# Patient Record
Sex: FEMALE | Race: WHITE | Employment: FULL TIME | URBAN - METROPOLITAN AREA
[De-identification: names, ages, dates, MRNs, and addresses within clinical notes are randomized per-mention and may not be internally consistent; named-entity substitution may affect disease eponyms.]

---

## 2017-05-08 DIAGNOSIS — F41.9 ANXIETY: Chronic | ICD-10-CM

## 2017-05-08 RX ORDER — HYDROCHLOROTHIAZIDE 25 MG/1
TABLET ORAL
Qty: 90 TAB | Refills: 0 | Status: SHIPPED | OUTPATIENT
Start: 2017-05-08 | End: 2017-06-27 | Stop reason: SDUPTHER

## 2017-05-08 RX ORDER — FLUOXETINE 10 MG/1
10 TABLET ORAL DAILY
Qty: 90 TAB | Refills: 0 | Status: SHIPPED | OUTPATIENT
Start: 2017-05-08 | End: 2018-01-15

## 2017-05-08 RX ORDER — LOSARTAN POTASSIUM 25 MG/1
25 TABLET ORAL DAILY
Qty: 90 TAB | Refills: 0 | Status: SHIPPED | OUTPATIENT
Start: 2017-05-08 | End: 2017-06-27 | Stop reason: SDUPTHER

## 2017-05-08 RX ORDER — FLUOXETINE 20 MG/1
20 TABLET ORAL DAILY
Qty: 90 TAB | Refills: 0 | Status: SHIPPED | OUTPATIENT
Start: 2017-05-08 | End: 2017-06-27 | Stop reason: SDUPTHER

## 2017-06-27 DIAGNOSIS — F41.9 ANXIETY: Chronic | ICD-10-CM

## 2017-06-28 RX ORDER — LOSARTAN POTASSIUM 25 MG/1
25 TABLET ORAL DAILY
Qty: 90 TAB | Refills: 0 | Status: SHIPPED | OUTPATIENT
Start: 2017-06-28 | End: 2018-01-15 | Stop reason: SDUPTHER

## 2017-06-28 RX ORDER — FLUOXETINE 20 MG/1
20 TABLET ORAL DAILY
Qty: 90 TAB | Refills: 0 | Status: SHIPPED | OUTPATIENT
Start: 2017-06-28 | End: 2017-10-17 | Stop reason: SDUPTHER

## 2017-06-28 RX ORDER — HYDROCHLOROTHIAZIDE 25 MG/1
TABLET ORAL
Qty: 90 TAB | Refills: 0 | Status: SHIPPED | OUTPATIENT
Start: 2017-06-28 | End: 2017-07-14 | Stop reason: SDUPTHER

## 2017-07-14 RX ORDER — LOSARTAN POTASSIUM 25 MG/1
TABLET ORAL
Qty: 90 TAB | Refills: 0 | Status: SHIPPED | OUTPATIENT
Start: 2017-07-14 | End: 2017-10-17 | Stop reason: SDUPTHER

## 2017-07-14 RX ORDER — HYDROCHLOROTHIAZIDE 25 MG/1
TABLET ORAL
Qty: 90 TAB | Refills: 0 | Status: SHIPPED | OUTPATIENT
Start: 2017-07-14 | End: 2017-10-17 | Stop reason: SDUPTHER

## 2017-10-16 DIAGNOSIS — F41.9 ANXIETY: Chronic | ICD-10-CM

## 2017-10-16 RX ORDER — HYDROCHLOROTHIAZIDE 25 MG/1
TABLET ORAL
Qty: 90 TAB | Refills: 0 | OUTPATIENT
Start: 2017-10-16

## 2017-10-16 RX ORDER — LOSARTAN POTASSIUM 25 MG/1
TABLET ORAL
Qty: 90 TAB | Refills: 0 | OUTPATIENT
Start: 2017-10-16

## 2017-10-17 RX ORDER — HYDROCHLOROTHIAZIDE 25 MG/1
TABLET ORAL
Qty: 90 TAB | Refills: 0 | Status: SHIPPED | OUTPATIENT
Start: 2017-10-17 | End: 2018-01-15 | Stop reason: SDUPTHER

## 2017-10-17 RX ORDER — FLUOXETINE 20 MG/1
20 TABLET ORAL DAILY
Qty: 90 TAB | Refills: 0 | Status: SHIPPED | OUTPATIENT
Start: 2017-10-17 | End: 2018-01-15

## 2017-10-17 RX ORDER — LOSARTAN POTASSIUM 25 MG/1
TABLET ORAL
Qty: 90 TAB | Refills: 0 | Status: SHIPPED | OUTPATIENT
Start: 2017-10-17 | End: 2018-01-15 | Stop reason: SDUPTHER

## 2018-01-15 ENCOUNTER — OFFICE VISIT (OUTPATIENT)
Dept: INTERNAL MEDICINE CLINIC | Age: 38
End: 2018-01-15

## 2018-01-15 VITALS
DIASTOLIC BLOOD PRESSURE: 85 MMHG | WEIGHT: 202 LBS | HEART RATE: 69 BPM | BODY MASS INDEX: 37.17 KG/M2 | HEIGHT: 62 IN | OXYGEN SATURATION: 99 % | SYSTOLIC BLOOD PRESSURE: 133 MMHG | TEMPERATURE: 98 F | RESPIRATION RATE: 16 BRPM

## 2018-01-15 DIAGNOSIS — I10 ESSENTIAL HYPERTENSION: Primary | Chronic | ICD-10-CM

## 2018-01-15 DIAGNOSIS — F41.8 ANXIETY WITH DEPRESSION: ICD-10-CM

## 2018-01-15 RX ORDER — HYDROCHLOROTHIAZIDE 25 MG/1
TABLET ORAL
Qty: 90 TAB | Refills: 1 | Status: SHIPPED | OUTPATIENT
Start: 2018-01-15 | End: 2018-08-03 | Stop reason: SDUPTHER

## 2018-01-15 RX ORDER — LOSARTAN POTASSIUM 25 MG/1
TABLET ORAL
Qty: 90 TAB | Refills: 1 | Status: SHIPPED | OUTPATIENT
Start: 2018-01-15 | End: 2018-08-03 | Stop reason: SDUPTHER

## 2018-01-15 RX ORDER — SERTRALINE HYDROCHLORIDE 25 MG/1
25 TABLET, FILM COATED ORAL DAILY
Qty: 30 TAB | Refills: 1 | Status: SHIPPED | OUTPATIENT
Start: 2018-01-15 | End: 2018-03-28 | Stop reason: SDUPTHER

## 2018-01-15 NOTE — MR AVS SNAPSHOT
[address] [place] [address] [phone] Patient: Nakia Pa MRN: [id number] LVN:3/84/[id number] Visit Information Date & Time Provider Department Dept. Phone Encounter #  
 1/15/2018  2:30 PM Elle Pa MD Brianna Pineda [phone] [id number] Follow-up Instructions Return for 2-3 weeks, med eval. Upcoming Health Maintenance Date Due  
 PAP AKA CERVICAL CYTOLOGY 9/12/2015 DTaP/Tdap/Td series (2 - Td) 1/6/2020 Allergies as of 1/15/2018  Review Complete On: 1/15/2018 By: Elle Pa MD  
 No Known Allergies Current Immunizations  Reviewed on 1/21/2016 Name Date Hepatitis B Vaccine 2/15/2008 Influenza Vaccine 10/5/2015, 9/23/2013 Influenza Vaccine (Quad) PF 10/3/2014 MMR 6/8/2013 11:14 AM  
 TDAP Vaccine 1/6/2010 Not reviewed this visit You Were Diagnosed With   
  
 Codes Comments Essential hypertension    -  Primary ICD-10-CM: I10 
ICD-9-CM: 401.9 Anxiety with depression     ICD-10-CM: F41.8 ICD-9-CM: 300.4 Vitals BP Pulse Temp Resp Height(growth percentile) Weight(growth percentile) 133/85 (BP 1 Location: Left arm, BP Patient Position: Sitting) 69 98 °F (36.7 °C) (Oral) 16 5' 2\" (1.575 m) 202 lb (91.6 kg) LMP SpO2 BMI OB Status Smoking Status 01/08/2018 99% 36.95 kg/m2 Having regular periods Never Smoker BMI and BSA Data Body Mass Index Body Surface Area  
 36.95 kg/m 2 2 m 2 Preferred Pharmacy Pharmacy Name Phone [phone] [phone] Your Updated Medication List  
  
   
This list is accurate as of: 1/15/18  3:00 PM.  Always use your most recent med list.  
  
  
  
  
 ALPRAZolam 0.5 mg tablet Commonly known as:  Joline Mcburney Take 0.5 mg by mouth three (3) times daily as needed. hydroCHLOROthiazide 25 mg tablet Commonly known as:  HYDRODIURIL  
take 1 tablet by mouth once daily  
  
 losartan 25 mg tablet Commonly known as:  COZAAR  
take 1 tablet by mouth once daily  
  
 sertraline 25 mg tablet Commonly known as:  ZOLOFT Take 1 Tab by mouth daily. Prescriptions Sent to Pharmacy Refills  
 hydroCHLOROthiazide (HYDRODIURIL) 25 mg tablet 1 Sig: take 1 tablet by mouth once daily Class: Normal  
 Pharmacy: [address] Ph #: [phone]  
 losartan (COZAAR) 25 mg tablet 1 Sig: take 1 tablet by mouth once daily Class: Normal  
 Pharmacy: [address] Ph #: [phone]  
 sertraline (ZOLOFT) 25 mg tablet 1 Sig: Take 1 Tab by mouth daily. Class: Normal  
 [facility]acy: [facility] [address] Ph #: [phone] Route: Oral  
  
We Performed the Following METABOLIC PANEL, COMPREHENSIVE [32693 CPT(R)] TSH RFX ON ABNORMAL TO FREE T4 [[id number] Custom] Follow-up Instructions Return for 2-3 weeks, med nataliya.  
  
  
Introducing [facility] & HEALTH SERVICES! Dear Lisa Done: Thank you for requesting a MyStore.com account. Our records indicate that you already have an active [email or web address] account. You can access your account anytime at [email or web address]) Did you know that you can access your hospital and ER discharge instructions at any time in [email or web address]? You can also review all of your test results from your hospital stay or ER visit. Additional Information If you have questions, please visit the Frequently Asked Questions section of the [email or web address] website at [email or web address]. Remember, [email or web address] is NOT to be used for urgent needs. For medical emergencies, dial 911. Now available from your iPhone and Android! Please provide this summary of care documentation to your next provider. Your primary care clinician is listed as Keyla Hinojosa. If you have any questions after today's visit, please call [phone].

## 2018-01-15 NOTE — PROGRESS NOTES
HPI  Ms. Van Mack is a 40y.o. year old female, she is seen today for follow up HTN. Says she is feeling depressed. Stopped taking prozac - says  said it wasn't helping - stopped it in November - feels the same way off prozac as on it. 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 Is working part time now special ed reading and math - 4 hours five days per week - only worked a few days so far since she started. While she wasn't working she stayed home with her 3 y/o daughter but says she didn't do much and spent most days on the sofa.  will still be traveling still but parents helping out now. Doesn't want to leave the house, used to enjoy spending time with friends. Worries about her weight, easily overwhelmed, feels bad about herself and feels self conscious. Has little motivation - example laundry is piled in her room, no motivation to put it away when previously kept a neat house. Sleep is erratic - may stay up all night, sleep all day. Also feeling anxious. Used to exercise and be on tennis team - stopped about a year ago. Has been avoiding people for months. No SI. Also notes hands and feet fall asleep easily - notes that especially hands when holding her phone. Feet never fall asleep when standing    Chief Complaint   Patient presents with    Medication Evaluation     depression scale opened        Prior to Admission medications    Medication Sig Start Date End Date Taking? Authorizing Provider   hydroCHLOROthiazide (HYDRODIURIL) 25 mg tablet take 1 tablet by mouth once daily 10/17/17  Yes Sally Coffman MD   losartan (COZAAR) 25 mg tablet take 1 tablet by mouth once daily 10/17/17  Yes Sally Coffman MD   FLUoxetine (PROZAC) 20 mg tablet Take 1 Tab by mouth daily.  10/17/17   Sally Coffman MD   FLUoxetine (PROZAC) 10 mg tablet Take 1 Tab by mouth daily. 5/8/17   Constantine Duarte MD   diazepam (VALIUM) 5 mg tablet Take 1 Tab by mouth every eight (8) hours as needed (spasm). Max Daily Amount: 15 mg. 8/25/16   Trent Lucio MD   naproxen (NAPROSYN) 500 mg tablet Take 1 Tab by mouth every twelve (12) hours as needed for Pain. 8/25/16   Trent Lucio MD   ALPRAZolam Ether Edman) 0.5 mg tablet Take 0.5 mg by mouth three (3) times daily as needed. 7/7/16   Historical Provider         No Known Allergies      REVIEW OF SYSTEMS:  Per HPI    PHYSICAL EXAM:  Visit Vitals    /85 (BP 1 Location: Left arm, BP Patient Position: Sitting)    Pulse 69    Temp 98 °F (36.7 °C) (Oral)    Resp 16    Ht 5' 2\" (1.575 m)    Wt 202 lb (91.6 kg)    LMP 01/08/2018    SpO2 99%    BMI 36.95 kg/m2     Constitutional: Appears well-developed and well-nourished. HENT:   Head: Normocephalic and atraumatic. Eyes: No scleral icterus. Neck: no lad, no tm, supple   Cardiovascular: Normal S1/S2, regular rhythm. No murmurs, rubs, or gallops. Pulmonary/Chest: Effort normal and breath sounds normal. No respiratory distress. No wheezes, rhonchi, or rales. Ext: No edema. Neurological: Alert. Psychiatric: anxious, depressed mood and affect. Behavior is normal. Good eye contact. Lab Results   Component Value Date/Time    Sodium 138 08/25/2016 09:45 AM    Potassium 3.3 08/25/2016 09:45 AM    Chloride 102 08/25/2016 09:45 AM    CO2 26 08/25/2016 09:45 AM    Anion gap 10 08/25/2016 09:45 AM    Glucose 94 08/25/2016 09:45 AM    BUN 10 08/25/2016 09:45 AM    Creatinine 0.76 08/25/2016 09:45 AM    BUN/Creatinine ratio 13 08/25/2016 09:45 AM    GFR est AA >60 08/25/2016 09:45 AM    GFR est non-AA >60 08/25/2016 09:45 AM    Calcium 8.4 08/25/2016 09:45 AM    Bilirubin, total 0.4 08/25/2016 09:45 AM    AST (SGOT) 16 08/25/2016 09:45 AM    Alk.  phosphatase 56 08/25/2016 09:45 AM    Protein, total 7.2 08/25/2016 09:45 AM    Albumin 3.7 08/25/2016 09:45 AM Globulin 3.5 08/25/2016 09:45 AM    A-G Ratio 1.1 08/25/2016 09:45 AM    ALT (SGPT) 24 08/25/2016 09:45 AM     No results found for: HBA1C, HGBE8, YNE8SAGZ   Lab Results   Component Value Date/Time    Cholesterol, total 201 06/30/2011 08:29 AM    HDL Cholesterol 54 06/30/2011 08:29 AM    LDL, calculated 127 06/30/2011 08:29 AM    VLDL, calculated 20 06/30/2011 08:29 AM    Triglyceride 99 06/30/2011 08:29 AM          ASSESSMENT/PLAN  Diagnoses and all orders for this visit:    1. Essential hypertension  -     hydroCHLOROthiazide (HYDRODIURIL) 25 mg tablet; take 1 tablet by mouth once daily  -     losartan (COZAAR) 25 mg tablet; take 1 tablet by mouth once daily  -     METABOLIC PANEL, COMPREHENSIVE  Controlled on current regimen, continue   2. Anxiety with depression  -     TSH RFX ON ABNORMAL TO FREE T4  -     sertraline (ZOLOFT) 25 mg tablet; Take 1 Tab by mouth daily. Contracts for safety. Agrees to see mental health specialist - agrees to call today to make appt to see provider at [facility]  zoloft has worked well in past and will restart    Health Maintenance Due   Topic Date Due    PAP AKA CERVICAL CYTOLOGY  09/12/2015        Follow-up Disposition:  Return for 2-3 weeks, med eval.     >40 min with patient >50% counseling/coordination of care     Reviewed plan of care. Patient has provided input and agrees with goals. The nurse provided the patient and/or family with advanced directive information if needed and encouraged the patient to provide a copy to the office when available.

## 2018-01-15 NOTE — PROGRESS NOTES
Reviewed record  In preparation for visit and have obtained necessary documentation. 1. Have you been to the ER, urgent care clinic since your last visit? Hospitalized since your last visit?no  2. Have you seen or consulted any other health care providers outside of the [address] [place] [address] since your last visit? Include any pap smears or colon screening. no  Advanced directives: Patient declines information on advanced directives. Patients vital signs discussed with physician.

## 2018-01-16 LAB
ALBUMIN SERPL-MCNC: 4.5 G/DL (ref 3.5–5.5)
ALBUMIN/GLOB SERPL: 1.5 {RATIO} (ref 1.2–2.2)
ALP SERPL-CCNC: 59 IU/L (ref 39–117)
ALT SERPL-CCNC: 17 IU/L (ref 0–32)
AST SERPL-CCNC: 18 IU/L (ref 0–40)
BILIRUB SERPL-MCNC: 0.3 MG/DL (ref 0–1.2)
BUN SERPL-MCNC: 13 MG/DL (ref 6–20)
BUN/CREAT SERPL: 16 (ref 9–23)
CALCIUM SERPL-MCNC: 9.5 MG/DL (ref 8.7–10.2)
CHLORIDE SERPL-SCNC: 96 MMOL/L (ref 96–106)
CO2 SERPL-SCNC: 31 MMOL/L (ref 18–29)
CREAT SERPL-MCNC: 0.8 MG/DL (ref 0.57–1)
GLOBULIN SER CALC-MCNC: 3 G/DL (ref 1.5–4.5)
GLUCOSE SERPL-MCNC: 123 MG/DL (ref 65–99)
POTASSIUM SERPL-SCNC: 3 MMOL/L (ref 3.5–5.2)
PROT SERPL-MCNC: 7.5 G/DL (ref 6–8.5)
SODIUM SERPL-SCNC: 142 MMOL/L (ref 134–144)
TSH SERPL DL<=0.005 MIU/L-ACNC: 1.7 UIU/ML (ref 0.45–4.5)

## 2018-01-16 RX ORDER — POTASSIUM CHLORIDE 20 MEQ/1
40 TABLET, EXTENDED RELEASE ORAL DAILY
Qty: 60 TAB | Refills: 0 | Status: SHIPPED | OUTPATIENT
Start: 2018-01-16 | End: 2018-03-28 | Stop reason: SDUPTHER

## 2018-01-16 NOTE — PROGRESS NOTES
Tell her potassium low - may be why she has some tingling in extremities - also may make her tired - start 40meq daily and recheck k in one week

## 2018-01-31 ENCOUNTER — OFFICE VISIT (OUTPATIENT)
Dept: INTERNAL MEDICINE CLINIC | Age: 38
End: 2018-01-31

## 2018-01-31 VITALS
RESPIRATION RATE: 16 BRPM | WEIGHT: 202 LBS | OXYGEN SATURATION: 99 % | HEIGHT: 62 IN | HEART RATE: 73 BPM | DIASTOLIC BLOOD PRESSURE: 90 MMHG | TEMPERATURE: 98.5 F | SYSTOLIC BLOOD PRESSURE: 132 MMHG | BODY MASS INDEX: 37.17 KG/M2

## 2018-01-31 DIAGNOSIS — I10 ESSENTIAL HYPERTENSION: Primary | Chronic | ICD-10-CM

## 2018-01-31 DIAGNOSIS — F41.9 ANXIETY: Chronic | ICD-10-CM

## 2018-01-31 NOTE — MR AVS SNAPSHOT
[address] [place] [address] [phone] Patient: Jonah Cordova MRN: [id number] WRM:[birth year] Visit Information Date & Time Provider Department Dept. Phone Encounter #  
 1/31/2018  1:45 PM Joi Navarro MD Kris Portillo [phone] [id number] Follow-up Instructions Return in about 3 months (around 4/30/2018) for bp, anxiety. Upcoming Health Maintenance Date Due  
 PAP AKA CERVICAL CYTOLOGY 9/12/2015 DTaP/Tdap/Td series (2 - Td) 1/6/2020 Allergies as of 1/31/2018  Review Complete On: 1/31/2018 By: Joi Navarro MD  
 No Known Allergies Current Immunizations  Reviewed on 1/21/2016 Name Date Hepatitis B Vaccine 2/15/2008 Influenza Vaccine 10/5/2015, 9/23/2013 Influenza Vaccine (Quad) PF 10/3/2014 MMR 6/8/2013 11:14 AM  
 TDAP Vaccine 1/6/2010 Not reviewed this visit You Were Diagnosed With   
  
 Codes Comments Essential hypertension    -  Primary ICD-10-CM: I10 
ICD-9-CM: 401.9 Anxiety     ICD-10-CM: F41.9 ICD-9-CM: 300.00 Vitals BP Pulse Temp Resp Height(growth percentile) Weight(growth percentile) 132/90 73 98.5 °F (36.9 °C) (Oral) 16 5' 2\" (1.575 m) 202 lb (91.6 kg) LMP SpO2 BMI OB Status Smoking Status 01/08/2018 99% 36.95 kg/m2 Having regular periods Never Smoker Vitals History BMI and BSA Data Body Mass Index Body Surface Area  
 36.95 kg/m 2 2 m 2 Preferred Pharmacy Pharmacy Name Phone [phone] [phone] Your Updated Medication List  
  
   
This list is accurate as of: 1/31/18  2:16 PM.  Always use your most recent med list.  
  
  
  
  
 ALPRAZolam 0.5 mg tablet Commonly known as:  Corytatiana Powellker Take 0.5 mg by mouth three (3) times daily as needed. hydroCHLOROthiazide 25 mg tablet Commonly known as:  HYDRODIURIL  
 take 1 tablet by mouth once daily  
  
 losartan 25 mg tablet Commonly known as:  COZAAR  
take 1 tablet by mouth once daily  
  
 potassium chloride 20 mEq tablet Commonly known as:  K-DUR, KLOR-CON Take 2 Tabs by mouth daily. sertraline 25 mg tablet Commonly known as:  ZOLOFT Take 1 Tab by mouth daily. Follow-up Instructions Return in about 3 months (around 4/30/2018) for bp, anxiety. Introducing [facility] & HEALTH SERVICES! Dear Calderon Rivera: Thank you for requesting a [employer] account. Our records indicate that you already have an active [employer] account. You can access your account anytime at [email or web address] Did you know that you can access your hospital and ER discharge instructions at any time in [employer]? You can also review all of your test results from your hospital stay or ER visit. Additional Information If you have questions, please visit the Frequently Asked Questions section of the [employer] website at [email or web address] Remember, [employer] is NOT to be used for urgent needs. For medical emergencies, dial 911. Now available from your iPhone and Android! Please provide this summary of care documentation to your next provider. Your primary care clinician is listed as Keyla Hinojosa. If you have any questions after today's visit, please call [phone].

## 2018-01-31 NOTE — PROGRESS NOTES
HPI  Ms. Ondina Saba is a 40y.o. year old female, she is seen today for follow up anxiety, HTN. Plan last visit:   Anxiety with depression  -     TSH RFX ON ABNORMAL TO FREE T4  -     sertraline (ZOLOFT) 25 mg tablet; Take 1 Tab by mouth daily. Contracts for safety. Agrees to see mental health specialist - agrees to call today to make appt to see provider at [facility]  zoloft has worked well in past and will restart    Found a counselor,  wouldn't pay. Parents help with children in morning. Mother kept sick child yesterday. Has back up. Not feeling down, depressed. Much better. Also medication is helping, having a job and routine is helping her mood. No insurance through her job. Has gotten back into walking group with [employer]. No longer feeling weak, no longer having tingly spells as in past.         Chief Complaint   Patient presents with    Blood Pressure Check    Anxiety        Prior to Admission medications    Medication Sig Start Date End Date Taking? Authorizing Provider   potassium chloride (K-DUR, KLOR-CON) 20 mEq tablet Take 2 Tabs by mouth daily. 1/16/18  Yes Ernestina Kamara MD   hydroCHLOROthiazide (HYDRODIURIL) 25 mg tablet take 1 tablet by mouth once daily 1/15/18  Yes Ernestina Kamara MD   losartan (COZAAR) 25 mg tablet take 1 tablet by mouth once daily 1/15/18  Yes Ernestina Kamara MD   sertraline (ZOLOFT) 25 mg tablet Take 1 Tab by mouth daily. 1/15/18  Yes Ernestina Kamara MD   ALPRAZolam Bal Genaoa) 0.5 mg tablet Take 0.5 mg by mouth three (3) times daily as needed. 7/7/16   Historical Provider         No Known Allergies      REVIEW OF SYSTEMS:  Per HPI    PHYSICAL EXAM:  Visit Vitals    /90    Pulse 73    Temp 98.5 °F (36.9 °C) (Oral)    Resp 16    Ht 5' 2\" (1.575 m)    Wt 202 lb (91.6 kg)    LMP 01/08/2018    SpO2 99%    BMI 36.95 kg/m2     Constitutional: Appears well-developed and well-nourished. No distress.    HENT:   Head: Normocephalic and atraumatic. Eyes: No scleral icterus. Cardiovascular: Normal S1/S2, regular rhythm. No murmurs, rubs, or gallops. Pulmonary/Chest: Effort normal and breath sounds normal. No respiratory distress. No wheezes, rhonchi, or rales. Ext: No edema. Neurological: Alert. Psychiatric: Normal mood and affect. Behavior is normal.     Lab Results   Component Value Date/Time    Sodium 142 01/15/2018 04:16 PM    Potassium 4.0 01/29/2018 04:25 PM    Chloride 96 01/15/2018 04:16 PM    CO2 31 01/15/2018 04:16 PM    Anion gap 10 08/25/2016 09:45 AM    Glucose 123 01/15/2018 04:16 PM    BUN 13 01/15/2018 04:16 PM    Creatinine 0.80 01/15/2018 04:16 PM    BUN/Creatinine ratio 16 01/15/2018 04:16 PM    GFR est  01/15/2018 04:16 PM    GFR est non-AA 94 01/15/2018 04:16 PM    Calcium 9.5 01/15/2018 04:16 PM    Bilirubin, total 0.3 01/15/2018 04:16 PM    AST (SGOT) 18 01/15/2018 04:16 PM    Alk. phosphatase 59 01/15/2018 04:16 PM    Protein, total 7.5 01/15/2018 04:16 PM    Albumin 4.5 01/15/2018 04:16 PM    Globulin 3.5 08/25/2016 09:45 AM    A-G Ratio 1.5 01/15/2018 04:16 PM    ALT (SGPT) 17 01/15/2018 04:16 PM     No results found for: HBA1C, HGBE8, KGH2LIDT   Lab Results   Component Value Date/Time    Cholesterol, total 201 06/30/2011 08:29 AM    HDL Cholesterol 54 06/30/2011 08:29 AM    LDL, calculated 127 06/30/2011 08:29 AM    VLDL, calculated 20 06/30/2011 08:29 AM    Triglyceride 99 06/30/2011 08:29 AM          ASSESSMENT/PLAN  Diagnoses and all orders for this visit:    1. Essential hypertension  Diastolic slightly high - no changes today  2. Anxiety  Improved - continue current medications - has good support system      Health Maintenance Due   Topic Date Due    PAP AKA CERVICAL CYTOLOGY  09/12/2015        Follow-up Disposition:  Return in about 3 months (around 4/30/2018) for bp, anxiety. Reviewed plan of care. Patient has provided input and agrees with goals.      The nurse provided the patient and/or family with advanced directive information if needed and encouraged the patient to provide a copy to the office when available.

## 2018-01-31 NOTE — PROGRESS NOTES
Reviewed record  In preparation for visit and have obtained necessary documentation. 1. Have you been to the ER, urgent care clinic since your last visit? Hospitalized since your last visit?no  2. Have you seen or consulted any other health care providers outside of the [address] [place] [address] since your last visit? Include any pap smears or colon screening. no  Advanced directives: Patient has been given information on advanced directives at a previous visit. Patients vital signs discussed with physician.

## 2018-03-28 DIAGNOSIS — F41.8 ANXIETY WITH DEPRESSION: ICD-10-CM

## 2018-03-29 RX ORDER — POTASSIUM CHLORIDE 1500 MG/1
TABLET, FILM COATED, EXTENDED RELEASE ORAL
Qty: 60 TAB | Refills: 0 | Status: SHIPPED | OUTPATIENT
Start: 2018-03-29 | End: 2018-05-15 | Stop reason: SDUPTHER

## 2018-03-29 RX ORDER — SERTRALINE HYDROCHLORIDE 25 MG/1
TABLET, FILM COATED ORAL
Qty: 30 TAB | Refills: 1 | Status: SHIPPED | OUTPATIENT
Start: 2018-03-29 | End: 2018-06-20 | Stop reason: SDUPTHER

## 2018-04-30 ENCOUNTER — OFFICE VISIT (OUTPATIENT)
Dept: INTERNAL MEDICINE CLINIC | Facility: CLINIC | Age: 38
End: 2018-04-30

## 2018-04-30 VITALS
BODY MASS INDEX: 37.97 KG/M2 | DIASTOLIC BLOOD PRESSURE: 80 MMHG | RESPIRATION RATE: 16 BRPM | HEART RATE: 74 BPM | SYSTOLIC BLOOD PRESSURE: 128 MMHG | HEIGHT: 62 IN | WEIGHT: 206.3 LBS | TEMPERATURE: 98.1 F | OXYGEN SATURATION: 98 %

## 2018-04-30 DIAGNOSIS — I10 ESSENTIAL HYPERTENSION: Primary | Chronic | ICD-10-CM

## 2018-04-30 DIAGNOSIS — F41.9 ANXIETY: Chronic | ICD-10-CM

## 2018-04-30 NOTE — MR AVS SNAPSHOT
[address] [place] [address] [phone] Patient: Viktoria Medina MRN: [id number] VAB:[birth year] Visit Information Date & Time Provider Department Dept. Phone Encounter #  
 4/30/2018  1:30 PM Edson Hodgson MD [facility] Internal Medicine of [address] [place] [address] [id number] Follow-up Instructions Return in about 6 months (around 10/30/2018) for bp, med eval. Upcoming Health Maintenance Date Due  
 PAP AKA CERVICAL CYTOLOGY 6/30/2018* Influenza Age 5 to Adult 8/1/2018 DTaP/Tdap/Td series (2 - Td) 1/6/2020 *Topic was postponed. The date shown is not the original due date. Allergies as of 4/30/2018  Review Complete On: 4/30/2018 By: Edson Hodgson MD  
 No Known Allergies Current Immunizations  Reviewed on 1/21/2016 Name Date Hepatitis B Vaccine 2/15/2008 Influenza Vaccine 10/5/2015, 9/23/2013 Influenza Vaccine (Quad) PF 10/3/2014 MMR 6/8/2013 11:14 AM  
 TDAP Vaccine 1/6/2010 Not reviewed this visit You Were Diagnosed With   
  
 Codes Comments Essential hypertension    -  Primary ICD-10-CM: I10 
ICD-9-CM: 401.9 Anxiety     ICD-10-CM: F41.9 ICD-9-CM: 300.00 Vitals BP Pulse Temp Resp Height(growth percentile) Weight(growth percentile) 128/80 (BP 1 Location: Right arm, BP Patient Position: Sitting) 74 98.1 °F (36.7 °C) (Oral) 16 5' 2\" (1.575 m) 206 lb 4.8 oz (93.6 kg) LMP SpO2 BMI OB Status Smoking Status 04/09/2018 (Approximate) 98% 37.73 kg/m2 Having regular periods Never Smoker Vitals History BMI and BSA Data Body Mass Index Body Surface Area  
 37.73 kg/m 2 2.02 m 2 Preferred Pharmacy Pharmacy Name Phone [facility] [phone] Your Updated Medication List  
  
   
This list is accurate as of 4/30/18  1:52 PM.  Always use your most recent med list.  
  
  
  
  
 ALPRAZolam 0.5 mg tablet Commonly known as:  Rosalene Yousif Take 0.5 mg by mouth three (3) times daily as needed. hydroCHLOROthiazide 25 mg tablet Commonly known as:  HYDRODIURIL  
take 1 tablet by mouth once daily  
  
 losartan 25 mg tablet Commonly known as:  COZAAR  
take 1 tablet by mouth once daily  
  
 potassium chloride SR 20 mEq tablet Commonly known as:  K-TAB  
TAKE 2 TABLETS BY MOUTH DAILY  
  
 sertraline 25 mg tablet Commonly known as:  ZOLOFT  
take 1 tablet by mouth once daily Follow-up Instructions Return in about 6 months (around 10/30/2018) for bp, med eval.  
  
  
Introducing [facility] & HEALTH SERVICES! Dear Rukhsana Sauceda: Thank you for requesting a [employer] account. Our records indicate that you already have an active [employer] account. You can access your account anytime at [email or web address] Did you know that you can access your hospital and ER discharge instructions at any time in [employer]? You can also review all of your test results from your hospital stay or ER visit. Additional Information If you have questions, please visit the Frequently Asked Questions section of the [employer] website at [email or web address] Remember, [employer] is NOT to be used for urgent needs. For medical emergencies, dial 911. Now available from your iPhone and Android! Please provide this summary of care documentation to your next provider. Your primary care clinician is listed as Keyla Hinojosa. If you have any questions after today's visit, please call [phone].

## 2018-04-30 NOTE — PROGRESS NOTES
HPI  Ms. Elly Matias is a 40y.o. year old female, she is seen today for follow up HTN, anxiety.  travels a lot for his job - gone for about 2 weeks every month. Enjoys her job. 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 Mood is much better - going out with friends. No sad, down or depressed. Not anxious anymore. Has been going to [employer] group which in past has avoided indicating improvement in mood. No chest pain or sob. No edema. Chief Complaint   Patient presents with    Blood Pressure Check     Room 2B// NOn fasting     Medication Evaluation        Prior to Admission medications    Medication Sig Start Date End Date Taking? Authorizing Provider   sertraline (ZOLOFT) 25 mg tablet take 1 tablet by mouth once daily 3/29/18  Yes Philip See MD   potassium chloride SR (K-TAB) 20 mEq tablet TAKE 2 TABLETS BY MOUTH DAILY 3/29/18  Yes Philip See MD   hydroCHLOROthiazide (HYDRODIURIL) 25 mg tablet take 1 tablet by mouth once daily 1/15/18  Yes Philip See MD   losartan (COZAAR) 25 mg tablet take 1 tablet by mouth once daily 1/15/18  Yes Philip See MD   ALPRAZolam Denton Kristie) 0.5 mg tablet Take 0.5 mg by mouth three (3) times daily as needed. 7/7/16  Yes Historical Provider         No Known Allergies      REVIEW OF SYSTEMS:  Per HPI    PHYSICAL EXAM:  Visit Vitals    /80 (BP 1 Location: Right arm, BP Patient Position: Sitting)    Pulse 74    Temp 98.1 °F (36.7 °C) (Oral)    Resp 16    Ht 5' 2\" (1.575 m)    Wt 206 lb 4.8 oz (93.6 kg)    LMP 04/09/2018 (Approximate)    SpO2 98%    BMI 37.73 kg/m2     Constitutional: Appears well-developed and well-nourished. No distress. HENT:   Head: Normocephalic and atraumatic. Eyes: No scleral icterus. Cardiovascular: Normal S1/S2, regular rhythm. No murmurs, rubs, or gallops. Pulmonary/Chest: Effort normal and breath sounds normal. No respiratory distress.  No wheezes, rhonchi, or rales. Ext: No edema. Neurological: Alert. Psychiatric: Normal mood and affect. Behavior is normal.     Lab Results   Component Value Date/Time    Sodium 142 01/15/2018 04:16 PM    Potassium 4.0 01/29/2018 04:25 PM    Chloride 96 01/15/2018 04:16 PM    CO2 31 (H) 01/15/2018 04:16 PM    Anion gap 10 08/25/2016 09:45 AM    Glucose 123 (H) 01/15/2018 04:16 PM    BUN 13 01/15/2018 04:16 PM    Creatinine 0.80 01/15/2018 04:16 PM    BUN/Creatinine ratio 16 01/15/2018 04:16 PM    GFR est  01/15/2018 04:16 PM    GFR est non-AA 94 01/15/2018 04:16 PM    Calcium 9.5 01/15/2018 04:16 PM    Bilirubin, total 0.3 01/15/2018 04:16 PM    AST (SGOT) 18 01/15/2018 04:16 PM    Alk. phosphatase 59 01/15/2018 04:16 PM    Protein, total 7.5 01/15/2018 04:16 PM    Albumin 4.5 01/15/2018 04:16 PM    Globulin 3.5 08/25/2016 09:45 AM    A-G Ratio 1.5 01/15/2018 04:16 PM    ALT (SGPT) 17 01/15/2018 04:16 PM     No results found for: HBA1C, HGBE8, JPY1QRYQ, [id number]   Lab Results   Component Value Date/Time    Cholesterol, total 201 (H) 06/30/2011 08:29 AM    HDL Cholesterol 54 06/30/2011 08:29 AM    LDL, calculated 127 (H) 06/30/2011 08:29 AM    VLDL, calculated 20 06/30/2011 08:29 AM    Triglyceride 99 06/30/2011 08:29 AM          ASSESSMENT/PLAN  Diagnoses and all orders for this visit:    1. Essential hypertension    2. Anxiety      BP controlled - continue current medications . Anxiety stable on current medications as well. No changes. There are no preventive care reminders to display for this patient. Follow-up Disposition:  Return in about 6 months (around 10/30/2018) for bp, med eval.       Reviewed plan of care. Patient has provided input and agrees with goals. The nurse provided the patient and/or family with advanced directive information if needed and encouraged the patient to provide a copy to the office when available.

## 2018-04-30 NOTE — PROGRESS NOTES
Eliazar Jordan  Identified pt with two pt identifiers(name and ). Chief Complaint   Patient presents with    Blood Pressure Check     Room 2B// NOn fasting     Medication Evaluation       1. Have you been to the ER, urgent care clinic since your last visit? Hospitalized since your last visit? NO    2. Have you seen or consulted any other health care providers outside of the [address] [place] [address] since your last visit? Include any pap smears or colon screening. NO      Dr Stark Savers notified of reason for visit, vitals and flowsheets obtained on patients. Patient received paperwork for advance directive during previous visit but has not completed at this time     Reviewed record In preparation for visit, huddled with provider and have obtained necessary documentation      Health Maintenance Due   Topic    PAP AKA CERVICAL CYTOLOGY        Wt Readings from Last 3 Encounters:   18 202 lb (91.6 kg)   01/15/18 202 lb (91.6 kg)   16 160 lb (72.6 kg)     Temp Readings from Last 3 Encounters:   18 98.5 °F (36.9 °C) (Oral)   01/15/18 98 °F (36.7 °C) (Oral)   16 98.4 °F (36.9 °C)     BP Readings from Last 3 Encounters:   18 132/90   01/15/18 133/85   16 131/70     Pulse Readings from Last 3 Encounters:   18 73   01/15/18 69   16 65     There were no vitals filed for this visit.       Learning Assessment:  :     Learning Assessment 2014   PRIMARY LEARNER Patient   HIGHEST LEVEL OF EDUCATION - PRIMARY LEARNER  4 YEARS OF COLLEGE   BARRIERS PRIMARY LEARNER NONE   PRIMARY LANGUAGE ENGLISH    NEED No   LEARNER PREFERENCE PRIMARY READING   ANSWERED BY patient   RELATIONSHIP SELF       Depression Screening:  :     PHQ over the last two weeks 2018   PHQ Not Done -   Little interest or pleasure in doing things Not at all   Feeling down, depressed or hopeless Not at all   Total Score PHQ 2 0   Trouble falling or staying asleep, or sleeping too much - Feeling tired or having little energy -   Poor appetite or overeating -   Feeling bad about yourself - or that you are a failure or have let yourself or your family down -   Trouble concentrating on things such as school, work, reading or watching TV -   Moving or speaking so slowly that other people could have noticed; or the opposite being so fidgety that others notice -   Thoughts of being better off dead, or hurting yourself in some way -   PHQ 9 Score -   How difficult have these problems made it for you to do your work, take care of your home and get along with others -       Fall Risk Assessment:  :     No flowsheet data found. Abuse Screening:  :     No flowsheet data found. ADL Screening:  :     ADL Assessment 6/24/2015   Feeding yourself No Help Needed   Getting from bed to chair No Help Needed   Getting dressed No Help Needed   Bathing or showering No Help Needed   Walk across the room (includes cane/walker) No Help Needed   Using the telphone No Help Needed   Taking your medications No Help Needed   Preparing meals No Help Needed   Managing money (expenses/bills) No Help Needed   Moderately strenuous housework (laundry) No Help Needed   Shopping for personal items (toiletries/medicines) No Help Needed   Shopping for groceries No Help Needed   Driving No Help Needed   Climbing a flight of stairs No Help Needed   Getting to places beyond walking distances No Help Needed        I have received verbal consent from Umer Viveros to discuss any/all medical information while they are present in the room. Medication reconciliation up to date and corrected with patient at this time.

## 2018-06-20 DIAGNOSIS — F41.8 ANXIETY WITH DEPRESSION: ICD-10-CM

## 2018-06-20 RX ORDER — SERTRALINE HYDROCHLORIDE 25 MG/1
TABLET, FILM COATED ORAL
Qty: 30 TAB | Refills: 1 | Status: SHIPPED | OUTPATIENT
Start: 2018-06-20 | End: 2018-08-03 | Stop reason: SDUPTHER

## 2018-08-03 DIAGNOSIS — F41.8 ANXIETY WITH DEPRESSION: ICD-10-CM

## 2018-08-03 DIAGNOSIS — I10 ESSENTIAL HYPERTENSION: Chronic | ICD-10-CM

## 2018-08-03 RX ORDER — LOSARTAN POTASSIUM 25 MG/1
TABLET ORAL
Qty: 90 TAB | Refills: 1 | Status: SHIPPED | OUTPATIENT
Start: 2018-08-03 | End: 2018-08-13 | Stop reason: SDUPTHER

## 2018-08-03 RX ORDER — HYDROCHLOROTHIAZIDE 25 MG/1
TABLET ORAL
Qty: 90 TAB | Refills: 1 | Status: SHIPPED | OUTPATIENT
Start: 2018-08-03 | End: 2018-08-13 | Stop reason: SDUPTHER

## 2018-08-03 RX ORDER — SERTRALINE HYDROCHLORIDE 25 MG/1
25 TABLET, FILM COATED ORAL DAILY
Qty: 90 TAB | Refills: 1 | Status: SHIPPED | OUTPATIENT
Start: 2018-08-03 | End: 2019-01-12 | Stop reason: SDUPTHER

## 2018-08-27 DIAGNOSIS — F41.8 ANXIETY WITH DEPRESSION: ICD-10-CM

## 2018-08-27 RX ORDER — SERTRALINE HYDROCHLORIDE 25 MG/1
TABLET, FILM COATED ORAL
Qty: 30 TAB | Refills: 1 | Status: SHIPPED | OUTPATIENT
Start: 2018-08-27 | End: 2019-06-14 | Stop reason: SDUPTHER

## 2019-06-14 DIAGNOSIS — I10 ESSENTIAL HYPERTENSION: Chronic | ICD-10-CM

## 2019-06-14 DIAGNOSIS — F41.8 ANXIETY WITH DEPRESSION: ICD-10-CM

## 2019-06-14 NOTE — TELEPHONE ENCOUNTER
Per Elisha:  Dr. Emilie Montanez Telephone   Received: Today   Message Contents   [facility] Front Office   Phone Number: [phone]             Pt requesting a refill on Rx Losartan, Rx HCTZ, and Rx Zoloft. Pt does not know the dosage. [facility] on file. Pt has an appointment on 7/3/19 at 1:30 pm. Please call to inform pt, if she can receive a 30 day supply of these medications until she can come in.

## 2019-06-17 RX ORDER — SERTRALINE HYDROCHLORIDE 25 MG/1
TABLET, FILM COATED ORAL
Qty: 30 TAB | Refills: 0 | Status: SHIPPED | OUTPATIENT
Start: 2019-06-17 | End: 2019-07-03 | Stop reason: SDUPTHER

## 2019-06-17 RX ORDER — LOSARTAN POTASSIUM 25 MG/1
TABLET ORAL
Qty: 30 TAB | Refills: 0 | Status: SHIPPED | OUTPATIENT
Start: 2019-06-17 | End: 2019-07-03 | Stop reason: SDUPTHER

## 2019-06-17 RX ORDER — HYDROCHLOROTHIAZIDE 25 MG/1
TABLET ORAL
Qty: 30 TAB | Refills: 0 | Status: SHIPPED | OUTPATIENT
Start: 2019-06-17 | End: 2019-07-19 | Stop reason: SDUPTHER

## 2019-07-03 ENCOUNTER — OFFICE VISIT (OUTPATIENT)
Dept: INTERNAL MEDICINE CLINIC | Facility: CLINIC | Age: 39
End: 2019-07-03

## 2019-07-03 VITALS
OXYGEN SATURATION: 98 % | TEMPERATURE: 98.4 F | DIASTOLIC BLOOD PRESSURE: 85 MMHG | SYSTOLIC BLOOD PRESSURE: 120 MMHG | WEIGHT: 188.2 LBS | RESPIRATION RATE: 16 BRPM | HEART RATE: 87 BPM | HEIGHT: 62 IN | BODY MASS INDEX: 34.63 KG/M2

## 2019-07-03 DIAGNOSIS — F41.9 ANXIETY: Chronic | ICD-10-CM

## 2019-07-03 DIAGNOSIS — I10 ESSENTIAL HYPERTENSION: Primary | ICD-10-CM

## 2019-07-03 DIAGNOSIS — N92.1 MENORRHAGIA WITH IRREGULAR CYCLE: ICD-10-CM

## 2019-07-03 RX ORDER — SERTRALINE HYDROCHLORIDE 50 MG/1
50 TABLET, FILM COATED ORAL DAILY
Qty: 90 TAB | Refills: 1 | Status: SHIPPED | OUTPATIENT
Start: 2019-07-03 | End: 2020-02-24

## 2019-07-03 NOTE — PROGRESS NOTES
Chief Complaint   Patient presents with    Medication Refill     1. Have you been to the ER, urgent care clinic since your last visit? Hospitalized since your last visit? No    2. Have you seen or consulted any other health care providers outside of the [address] [place] [address] since your last visit? Include any pap smears or colon screening. No    3) Do you have an Advance Directive on file?  no

## 2019-07-03 NOTE — PROGRESS NOTES
HPI  Ms. Laurie Walker is a 45y.o. year old female, she is seen today for follow up anxiety, HTN. Has lost 18# in a little over a year. Under a lot of stress,  from , living with parents and will move into house down the road from them. Sharing custody with her , planning on divorce. Home schooled her older daughter and another child last year. Has been working as  for [employer]. Has been more irritable, less patience. No SI. Will get into counseling - has called to make appt. Has been exercising at gym - cardio, weights about 3 days per week. No chest pain, sob, palpitations. Has been getting periods every 2 weeks - heavy. Seen by gyn in January and told to follow up if didn't resolve. Has good support system with friends, [employer] members. Chief Complaint   Patient presents with    Medication Refill        Prior to Admission medications    Medication Sig Start Date End Date Taking? Authorizing Provider   sertraline (ZOLOFT) 50 mg tablet Take 1 Tab by mouth daily. 7/3/19  Yes Roshan Martinez MD   hydroCHLOROthiazide (HYDRODIURIL) 25 mg tablet take 1 tablet by mouth once daily 6/17/19  Yes Roshan Martinez MD   losartan (COZAAR) 25 mg tablet TAKE 1 TABLET DAILY 1/13/19  Yes Roshan Martinez MD   ALPRAZolam Darlen Lobe) 0.5 mg tablet Take 0.5 mg by mouth three (3) times daily as needed. 7/7/16  Yes Provider, Historical   potassium chloride SR (K-TAB) 20 mEq tablet TAKE 2 TABLETS BY MOUTH DAILY 5/16/18   Roshan Martinez MD         No Known Allergies      REVIEW OF SYSTEMS:  Per HPI    PHYSICAL EXAM:  Visit Vitals  /85 (BP 1 Location: Left arm, BP Patient Position: Sitting)   Pulse 87   Temp 98.4 °F (36.9 °C) (Oral)   Resp 16   Ht 5' 2\" (1.575 m)   Wt 188 lb 3.2 oz (85.4 kg)   LMP 06/23/2019   SpO2 98%   BMI 34.42 kg/m²     Constitutional: Appears well-developed and well-nourished. No distress.    HENT:   Head: Normocephalic and atraumatic. Eyes: No scleral icterus. Cardiovascular: Normal S1/S2, regular rhythm. No murmurs, rubs, or gallops. Pulmonary/Chest: Effort normal and breath sounds normal. No respiratory distress. No wheezes, rhonchi, or rales. Ext: No edema. Neurological: Alert. Psychiatric: Normal mood and affect. Behavior is normal.     Lab Results   Component Value Date/Time    Sodium 142 01/15/2018 04:16 PM    Potassium 4.0 01/29/2018 04:25 PM    Chloride 96 01/15/2018 04:16 PM    CO2 31 (H) 01/15/2018 04:16 PM    Anion gap 10 08/25/2016 09:45 AM    Glucose 123 (H) 01/15/2018 04:16 PM    BUN 13 01/15/2018 04:16 PM    Creatinine 0.80 01/15/2018 04:16 PM    BUN/Creatinine ratio 16 01/15/2018 04:16 PM    GFR est  01/15/2018 04:16 PM    GFR est non-AA 94 01/15/2018 04:16 PM    Calcium 9.5 01/15/2018 04:16 PM    Bilirubin, total 0.3 01/15/2018 04:16 PM    AST (SGOT) 18 01/15/2018 04:16 PM    Alk. phosphatase 59 01/15/2018 04:16 PM    Protein, total 7.5 01/15/2018 04:16 PM    Albumin 4.5 01/15/2018 04:16 PM    Globulin 3.5 08/25/2016 09:45 AM    A-G Ratio 1.5 01/15/2018 04:16 PM    ALT (SGPT) 17 01/15/2018 04:16 PM     No results found for: HBA1C, HGBE8, SXN9QSQQ, TKA5DKXC   Lab Results   Component Value Date/Time    Cholesterol, total 201 (H) 06/30/2011 08:29 AM    HDL Cholesterol 54 06/30/2011 08:29 AM    LDL, calculated 127 (H) 06/30/2011 08:29 AM    VLDL, calculated 20 06/30/2011 08:29 AM    Triglyceride 99 06/30/2011 08:29 AM          ASSESSMENT/PLAN  Diagnoses and all orders for this visit:    1. Essential hypertension  -     METABOLIC PANEL, BASIC  Controlled - continue current medications   2. Anxiety  -     sertraline (ZOLOFT) 50 mg tablet; Take 1 Tab by mouth daily. Worse - increase zoloft - agree with counseling  3.  Menorrhagia with irregular cycle  -     CBC WITH AUTOMATED DIFF  -     TSH RFX ON ABNORMAL TO FREE T4  Check labs, follow up with gyn        Health Maintenance Due   Topic Date Due    PAP AKA CERVICAL CYTOLOGY  09/12/2015        Follow-up and Dispositions    · Return for 4-6 weeks med eval.            Reviewed plan of care. Patient has provided input and agrees with goals. The nurse provided the patient and/or family with advanced directive information if needed and encouraged the patient to provide a copy to the office when available.

## 2019-07-04 LAB
BASOPHILS # BLD AUTO: 0.1 X10E3/UL (ref 0–0.2)
BASOPHILS NFR BLD AUTO: 1 %
BUN SERPL-MCNC: 12 MG/DL (ref 6–20)
BUN/CREAT SERPL: 16 (ref 9–23)
CALCIUM SERPL-MCNC: 9.6 MG/DL (ref 8.7–10.2)
CHLORIDE SERPL-SCNC: 100 MMOL/L (ref 96–106)
CO2 SERPL-SCNC: 26 MMOL/L (ref 20–29)
CREAT SERPL-MCNC: 0.75 MG/DL (ref 0.57–1)
EOSINOPHIL # BLD AUTO: 0.1 X10E3/UL (ref 0–0.4)
EOSINOPHIL NFR BLD AUTO: 2 %
ERYTHROCYTE [DISTWIDTH] IN BLOOD BY AUTOMATED COUNT: 13.3 % (ref 12.3–15.4)
GLUCOSE SERPL-MCNC: 104 MG/DL (ref 65–99)
HCT VFR BLD AUTO: 44.2 % (ref 34–46.6)
HGB BLD-MCNC: 15.3 G/DL (ref 11.1–15.9)
IMM GRANULOCYTES # BLD AUTO: 0 X10E3/UL (ref 0–0.1)
IMM GRANULOCYTES NFR BLD AUTO: 0 %
LYMPHOCYTES # BLD AUTO: 2.2 X10E3/UL (ref 0.7–3.1)
LYMPHOCYTES NFR BLD AUTO: 31 %
MCH RBC QN AUTO: 32 PG (ref 26.6–33)
MCHC RBC AUTO-ENTMCNC: 34.6 G/DL (ref 31.5–35.7)
MCV RBC AUTO: 93 FL (ref 79–97)
MONOCYTES # BLD AUTO: 0.5 X10E3/UL (ref 0.1–0.9)
MONOCYTES NFR BLD AUTO: 7 %
NEUTROPHILS # BLD AUTO: 4.2 X10E3/UL (ref 1.4–7)
NEUTROPHILS NFR BLD AUTO: 59 %
PLATELET # BLD AUTO: 300 X10E3/UL (ref 150–450)
POTASSIUM SERPL-SCNC: 3.4 MMOL/L (ref 3.5–5.2)
RBC # BLD AUTO: 4.78 X10E6/UL (ref 3.77–5.28)
SODIUM SERPL-SCNC: 139 MMOL/L (ref 134–144)
TSH SERPL DL<=0.005 MIU/L-ACNC: 1.1 UIU/ML (ref 0.45–4.5)
WBC # BLD AUTO: 7.1 X10E3/UL (ref 3.4–10.8)

## 2019-07-05 NOTE — PROGRESS NOTES
Is she taking potassium? If so new dose is 40meq am, 20meq pm. If not, encourage compliance.  Other labs normal.

## 2019-07-30 DIAGNOSIS — I10 ESSENTIAL HYPERTENSION: Chronic | ICD-10-CM

## 2019-07-30 NOTE — TELEPHONE ENCOUNTER
Pt states at last OV thought [facility] but [facility]'t and she is currently out of medication  Did advise pt those Rxs were went to the local [address] [place] [address] on 7/19/19 - pt states will go pick those up, but would [facility]

## 2019-07-31 DIAGNOSIS — I10 ESSENTIAL HYPERTENSION: Chronic | ICD-10-CM

## 2019-07-31 RX ORDER — HYDROCHLOROTHIAZIDE 25 MG/1
TABLET ORAL
Qty: 90 TAB | Refills: 1 | Status: SHIPPED | OUTPATIENT
Start: 2019-07-31 | End: 2019-07-31 | Stop reason: SDUPTHER

## 2019-07-31 RX ORDER — LOSARTAN POTASSIUM 25 MG/1
TABLET ORAL
Qty: 90 TAB | Refills: 1 | Status: SHIPPED | OUTPATIENT
Start: 2019-07-31 | End: 2020-01-06

## 2019-08-01 RX ORDER — HYDROCHLOROTHIAZIDE 25 MG/1
TABLET ORAL
Qty: 90 TAB | Refills: 1 | Status: SHIPPED | OUTPATIENT
Start: 2019-08-01 | End: 2020-01-06

## 2019-08-01 RX ORDER — LOSARTAN POTASSIUM 25 MG/1
TABLET ORAL
Qty: 90 TAB | Refills: 0 | Status: SHIPPED | OUTPATIENT
Start: 2019-08-01 | End: 2019-08-22 | Stop reason: SDUPTHER

## 2019-08-22 ENCOUNTER — OFFICE VISIT (OUTPATIENT)
Dept: INTERNAL MEDICINE CLINIC | Facility: CLINIC | Age: 39
End: 2019-08-22

## 2019-08-22 VITALS
WEIGHT: 191 LBS | TEMPERATURE: 98.2 F | DIASTOLIC BLOOD PRESSURE: 80 MMHG | SYSTOLIC BLOOD PRESSURE: 120 MMHG | RESPIRATION RATE: 14 BRPM | BODY MASS INDEX: 35.15 KG/M2 | OXYGEN SATURATION: 96 % | HEART RATE: 69 BPM | HEIGHT: 62 IN

## 2019-08-22 DIAGNOSIS — I10 ESSENTIAL HYPERTENSION: Primary | Chronic | ICD-10-CM

## 2019-08-22 DIAGNOSIS — F41.9 ANXIETY: Chronic | ICD-10-CM

## 2019-08-22 RX ORDER — ALPRAZOLAM 0.5 MG/1
0.5 TABLET ORAL
Qty: 10 TAB | Refills: 0 | Status: SHIPPED | OUTPATIENT
Start: 2019-08-22 | End: 2020-11-27 | Stop reason: SDUPTHER

## 2019-08-22 NOTE — PROGRESS NOTES
Flakita Razo  Identified pt with two pt identifiers(name and ). Chief Complaint   Patient presents with    Medication Evaluation     Room . Have you been to the ER, urgent care clinic since your last visit? Hospitalized since your last visit? NO    2. Have you seen or consulted any other health care providers outside of the [facility] since your last visit? Include any pap smears or colon screening. NO      Provider notified of reason for visit, vitals and flowsheets obtained on patients. Patient received paperwork for advance directive during previous visit but has not completed at this time     Reviewed record In preparation for visit, huddled with provider and have obtained necessary documentation      Health Maintenance Due   Topic    PAP AKA CERVICAL CYTOLOGY     Influenza Age 5 to Adult        Wt Readings from Last 3 Encounters:   19 188 lb 3.2 oz (85.4 kg)   18 206 lb 4.8 oz (93.6 kg)   18 202 lb (91.6 kg)     Temp Readings from Last 3 Encounters:   19 98.4 °F (36.9 °C) (Oral)   18 98.1 °F (36.7 °C) (Oral)   18 98.5 °F (36.9 °C) (Oral)     BP Readings from Last 3 Encounters:   19 120/85   18 128/80   18 132/90     Pulse Readings from Last 3 Encounters:   19 87   18 74   18 73     There were no vitals filed for this visit.       Learning Assessment:  :     Learning Assessment 2014   PRIMARY LEARNER Patient   HIGHEST LEVEL OF EDUCATION - PRIMARY LEARNER  4 YEARS OF COLLEGE   BARRIERS PRIMARY LEARNER NONE   PRIMARY LANGUAGE ENGLISH    NEED No   LEARNER PREFERENCE PRIMARY READING   ANSWERED BY patient   RELATIONSHIP SELF       Depression Screening:  :     3 most recent PHQ Screens 7/3/2019   PHQ Not Done -   Little interest or pleasure in doing things Not at all   Feeling down, depressed, irritable, or hopeless Not at all   Total Score PHQ 2 0   Trouble falling or staying asleep, or sleeping too much -   Feeling tired or having little energy -   Poor appetite, weight loss, or overeating -   Feeling bad about yourself - or that you are a failure or have let yourself or your family down -   Trouble concentrating on things such as school, work, reading, or watching TV -   Moving or speaking so slowly that other people could have noticed; or the opposite being so fidgety that others notice -   Thoughts of being better off dead, or hurting yourself in some way -   PHQ 9 Score -   How difficult have these problems made it for you to do your work, take care of your home and get along with others -       Fall Risk Assessment:  :     Fall Risk Assessment, last 12 mths 7/3/2019   Able to walk? Yes   Fall in past 12 months? No       Abuse Screening:  :     Abuse Screening Questionnaire 7/3/2019   Do you ever feel afraid of your partner? N   Are you in a relationship with someone who physically or mentally threatens you? N   Is it safe for you to go home? Y       ADL Screening:  :     ADL Assessment 6/24/2015   Feeding yourself No Help Needed   Getting from bed to chair No Help Needed   Getting dressed No Help Needed   Bathing or showering No Help Needed   Walk across the room (includes cane/walker) No Help Needed   Using the telphone No Help Needed   Taking your medications No Help Needed   Preparing meals No Help Needed   Managing money (expenses/bills) No Help Needed   Moderately strenuous housework (laundry) No Help Needed   Shopping for personal items (toiletries/medicines) No Help Needed   Shopping for groceries No Help Needed   Driving No Help Needed   Climbing a flight of stairs No Help Needed   Getting to places beyond walking distances No Help Needed         Medication reconciliation up to date and corrected with patient at this time.

## 2019-08-22 NOTE — PROGRESS NOTES
HPI  Ms. Juwan Hart is a 44y.o. year old female, she is seen today for follow up HTN, anxiety. Moved about 3 weeks ago to new home with her children,  from . Had a panic attack over the weekend. Has been feeling more anxious overall, but doesn't want to increase zoloft. Has had xanax in past didn't use. Will be home schooling oldest daughter, youngest will be going to school. Sees counselor once a week - Néstor Guzman. Will see gynecologist because periods still irregular, heavy. No chest pain or sob. Sometimes forgets to take potassium. Chief Complaint   Patient presents with    Medication Evaluation     Room 2A// fasting // low K last labs // pap last done Jan 2019 @ [facility]         Prior to Admission medications    Medication Sig Start Date End Date Taking? Authorizing Provider   ALPRAZolam Colie Bandera) 0.5 mg tablet Take 1 Tab by mouth three (3) times daily as needed for Anxiety. Max Daily Amount: 1.5 mg. 8/22/19  Yes Gerardo Miles MD   hydroCHLOROthiazide (HYDRODIURIL) 25 mg tablet TAKE 1 TABLET BY MOUTH ONCE DAILY 8/1/19  Yes Gerardo Miles MD   losartan (COZAAR) 25 mg tablet TAKE 1 TABLET BY MOUTH ONCE DAILY 7/31/19  Yes Gerardo Miles MD   sertraline (ZOLOFT) 50 mg tablet Take 1 Tab by mouth daily. 7/3/19  Yes Gerardo Miles MD   potassium chloride SR (K-TAB) 20 mEq tablet TAKE 2 TABLETS BY MOUTH DAILY 5/16/18  Yes Gerardo Miles MD         No Known Allergies      REVIEW OF SYSTEMS:  Per HPI    PHYSICAL EXAM:  Visit Vitals  /80   Pulse 69   Temp 98.2 °F (36.8 °C) (Oral)   Resp 14   Ht 5' 2\" (1.575 m)   Wt 191 lb (86.6 kg)   LMP 08/09/2019 (Approximate)   SpO2 96%   BMI 34.93 kg/m²     Constitutional: Appears well-developed and well-nourished. No distress. HENT:   Head: Normocephalic and atraumatic. Eyes: No scleral icterus. Cardiovascular: Normal S1/S2, regular rhythm. No murmurs, rubs, or gallops.   Pulmonary/Chest: Effort normal and breath sounds normal. No respiratory distress. No wheezes, rhonchi, or rales. Ext: No edema. Neurological: Alert. Psychiatric: Normal mood and affect. Behavior is normal.     Lab Results   Component Value Date/Time    Sodium 139 07/03/2019 02:02 PM    Potassium 3.4 (L) 07/03/2019 02:02 PM    Chloride 100 07/03/2019 02:02 PM    CO2 26 07/03/2019 02:02 PM    Anion gap 10 08/25/2016 09:45 AM    Glucose 104 (H) 07/03/2019 02:02 PM    BUN 12 07/03/2019 02:02 PM    Creatinine 0.75 07/03/2019 02:02 PM    BUN/Creatinine ratio 16 07/03/2019 02:02 PM    GFR est  07/03/2019 02:02 PM    GFR est non- 07/03/2019 02:02 PM    Calcium 9.6 07/03/2019 02:02 PM    Bilirubin, total 0.3 01/15/2018 04:16 PM    AST (SGOT) 18 01/15/2018 04:16 PM    Alk. phosphatase 59 01/15/2018 04:16 PM    Protein, total 7.5 01/15/2018 04:16 PM    Albumin 4.5 01/15/2018 04:16 PM    Globulin 3.5 08/25/2016 09:45 AM    A-G Ratio 1.5 01/15/2018 04:16 PM    ALT (SGPT) 17 01/15/2018 04:16 PM     No results found for: HBA1C, HGBE8, ZGD2FSNO, [id number]   Lab Results   Component Value Date/Time    Cholesterol, total 201 (H) 06/30/2011 08:29 AM    HDL Cholesterol 54 06/30/2011 08:29 AM    LDL, calculated 127 (H) 06/30/2011 08:29 AM    VLDL, calculated 20 06/30/2011 08:29 AM    Triglyceride 99 06/30/2011 08:29 AM          ASSESSMENT/PLAN  Diagnoses and all orders for this visit:    1. Essential hypertension  Controlled on current regimen, continue   2. Anxiety  -     ALPRAZolam (XANAX) 0.5 mg tablet; Take 1 Tab by mouth three (3) times daily as needed for Anxiety. Max Daily Amount: 1.5 mg. Continue current dose zoloft - add above for severe panic attacks        Health Maintenance Due   Topic Date Due    PAP AKA CERVICAL CYTOLOGY  09/12/2015        Follow-up and Dispositions    · Return in about 4 months (around 12/22/2019) for bp, anxiety. Reviewed plan of care. Patient has provided input and agrees with goals.      The nurse provided the patient and/or family with advanced directive information if needed and encouraged the patient to provide a copy to the office when available.

## 2020-01-04 DIAGNOSIS — I10 ESSENTIAL HYPERTENSION: Chronic | ICD-10-CM

## 2020-01-06 RX ORDER — LOSARTAN POTASSIUM 25 MG/1
TABLET ORAL
Qty: 90 TAB | Refills: 4 | Status: SHIPPED | OUTPATIENT
Start: 2020-01-06 | End: 2020-11-27 | Stop reason: SDUPTHER

## 2020-01-06 RX ORDER — HYDROCHLOROTHIAZIDE 25 MG/1
TABLET ORAL
Qty: 90 TAB | Refills: 4 | Status: SHIPPED | OUTPATIENT
Start: 2020-01-06 | End: 2020-02-28 | Stop reason: SDUPTHER

## 2020-02-24 ENCOUNTER — OFFICE VISIT (OUTPATIENT)
Dept: INTERNAL MEDICINE CLINIC | Facility: CLINIC | Age: 40
End: 2020-02-24

## 2020-02-24 VITALS
RESPIRATION RATE: 14 BRPM | OXYGEN SATURATION: 98 % | DIASTOLIC BLOOD PRESSURE: 81 MMHG | TEMPERATURE: 98.1 F | BODY MASS INDEX: 36.99 KG/M2 | WEIGHT: 201 LBS | SYSTOLIC BLOOD PRESSURE: 125 MMHG | HEIGHT: 62 IN | HEART RATE: 75 BPM

## 2020-02-24 DIAGNOSIS — I10 ESSENTIAL HYPERTENSION: Primary | Chronic | ICD-10-CM

## 2020-02-24 DIAGNOSIS — E66.01 SEVERE OBESITY (HCC): ICD-10-CM

## 2020-02-24 DIAGNOSIS — F41.9 ANXIETY: Chronic | ICD-10-CM

## 2020-02-24 RX ORDER — POTASSIUM CHLORIDE 1500 MG/1
20 TABLET, FILM COATED, EXTENDED RELEASE ORAL 2 TIMES DAILY
Qty: 180 TAB | Refills: 1 | Status: SHIPPED | OUTPATIENT
Start: 2020-02-24 | End: 2020-09-11 | Stop reason: SDUPTHER

## 2020-02-24 RX ORDER — DULOXETIN HYDROCHLORIDE 30 MG/1
30 CAPSULE, DELAYED RELEASE ORAL DAILY
Qty: 30 CAP | Refills: 1 | Status: SHIPPED | OUTPATIENT
Start: 2020-02-24 | End: 2020-05-05

## 2020-02-24 RX ORDER — SERTRALINE HYDROCHLORIDE 50 MG/1
50 TABLET, FILM COATED ORAL DAILY
Qty: 90 TAB | Refills: 1 | Status: CANCELLED | OUTPATIENT
Start: 2020-02-24

## 2020-02-24 NOTE — PATIENT INSTRUCTIONS
Stop sertraline and start duloxetine 30mg daily. After 2 weeks if symptoms not improving increase to 60mg daily.

## 2020-02-24 NOTE — PROGRESS NOTES
HPI  Ms. Jeane Hill is a 44y.o. year old female, she is seen today for follow up HTN, anxiety. Depression/anxiety worse since the holidays, feels she is now starting to come out of it. Seeing a counselor weekly. Trying to go to gym - planning on going up to 5x per week in future. 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 Notes on days she has off she is sleeping more. No SI. Still having panic attacks. No chest pain, sob, dizziness  Both girls are in dance - hours per week in classes. Working at [employer] - 3-4 shifts per week - was working 5 days per week during the holidays  - about 20 hours per week. Has 50/50 custody with her ex-. Chief Complaint   Patient presents with    Blood Pressure Check     Room 2B//     Anxiety        Prior to Admission medications    Medication Sig Start Date End Date Taking? Authorizing Provider   potassium chloride SR (K-TAB) 20 mEq tablet Take 1 Tab by mouth two (2) times a day. 2/24/20  Yes Alexsandra Richey MD   DULoxetine (CYMBALTA) 30 mg capsule Take 1 Cap by mouth daily. 2/24/20  Yes Alexsandra Richey MD   losartan (COZAAR) 25 mg tablet TAKE 1 TABLET DAILY 1/6/20  Yes Alexsandra Richey MD   hydroCHLOROthiazide (HYDRODIURIL) 25 mg tablet TAKE 1 TABLET DAILY 1/6/20  Yes Alexsandra Richey MD   ALPRAZolam Cherlynn Sharp) 0.5 mg tablet Take 1 Tab by mouth three (3) times daily as needed for Anxiety. Max Daily Amount: 1.5 mg. 8/22/19  Yes Alexsandra Richey MD   sertraline (ZOLOFT) 50 mg tablet Take 1 Tab by mouth daily.  7/3/19 2/24/20  Alexsandra Richey MD   potassium chloride SR (K-TAB) 20 mEq tablet TAKE 2 TABLETS BY MOUTH DAILY 5/16/18 2/24/20  Alexsandra Richey MD         No Known Allergies      REVIEW OF SYSTEMS:  Per HPI    PHYSICAL EXAM:  Visit Vitals  /81 (BP 1 Location: Left arm, BP Patient Position: Sitting)   Pulse 75   Temp 98.1 °F (36.7 °C) (Oral)   Resp 14   Ht 5' 2\" (1.575 m)   Wt 201 lb (91.2 kg)   LMP 02/07/2020 (Exact Date)   SpO2 98%   BMI 36.76 kg/m²     Constitutional: Appears well-developed and well-nourished. No distress. HENT:   Head: Normocephalic and atraumatic. Eyes: No scleral icterus. Cardiovascular: Normal S1/S2, regular rhythm. No murmurs, rubs, or gallops. Pulmonary/Chest: Effort normal and breath sounds normal. No respiratory distress. No wheezes, rhonchi, or rales. Ext: No edema. Neurological: Alert. Psychiatric: Normal mood and affect, tearful at times but consolable. Behavior is normal.     Lab Results   Component Value Date/Time    Sodium 139 07/03/2019 02:02 PM    Potassium 3.4 (L) 07/03/2019 02:02 PM    Chloride 100 07/03/2019 02:02 PM    CO2 26 07/03/2019 02:02 PM    Anion gap 10 08/25/2016 09:45 AM    Glucose 104 (H) 07/03/2019 02:02 PM    BUN 12 07/03/2019 02:02 PM    Creatinine 0.75 07/03/2019 02:02 PM    BUN/Creatinine ratio 16 07/03/2019 02:02 PM    GFR est  07/03/2019 02:02 PM    GFR est non- 07/03/2019 02:02 PM    Calcium 9.6 07/03/2019 02:02 PM    Bilirubin, total 0.3 01/15/2018 04:16 PM    AST (SGOT) 18 01/15/2018 04:16 PM    Alk. phosphatase 59 01/15/2018 04:16 PM    Protein, total 7.5 01/15/2018 04:16 PM    Albumin 4.5 01/15/2018 04:16 PM    Globulin 3.5 08/25/2016 09:45 AM    A-G Ratio 1.5 01/15/2018 04:16 PM    ALT (SGPT) 17 01/15/2018 04:16 PM     No results found for: HBA1C, HGBE8, OTY4NSCN, HPA3AGNU   Lab Results   Component Value Date/Time    Cholesterol, total 201 (H) 06/30/2011 08:29 AM    HDL Cholesterol 54 06/30/2011 08:29 AM    LDL, calculated 127 (H) 06/30/2011 08:29 AM    VLDL, calculated 20 06/30/2011 08:29 AM    Triglyceride 99 06/30/2011 08:29 AM          ASSESSMENT/PLAN  Diagnoses and all orders for this visit:    1. Essential hypertension  -     potassium chloride SR (K-TAB) 20 mEq tablet; Take 1 Tab by mouth two (2) times a day. Controlled on current regimen, continue   2.  Anxiety  -     DULoxetine (CYMBALTA) 30 mg capsule; Take 1 Cap by mouth daily. Not controlled - stop zolft and start above - may increase to 60mg daily after 2 weeks if not improving  3. Severe obesity ([address]  Will start exercise - gained 10# recently        Health Maintenance Due   Topic Date Due    PAP AKA CERVICAL CYTOLOGY  09/12/2015    Influenza Age 5 to Adult  08/01/2019    DTaP/Tdap/Td series (2 - Td) 01/06/2020        Follow-up and Dispositions    · Return for 4-6 weeks med eval.            Reviewed plan of care. Patient has provided input and agrees with goals. The nurse provided the patient and/or family with advanced directive information if needed and encouraged the patient to provide a copy to the office when available.

## 2020-02-28 DIAGNOSIS — I10 ESSENTIAL HYPERTENSION: Chronic | ICD-10-CM

## 2020-02-28 NOTE — TELEPHONE ENCOUNTER
[place]rell [place] To  [facility] Nurs[facility] Sent  2/28/2020  2:59 PM   I need a refill of my HCTZ to be sent to [facility]. Long story, but my (soon to not be) will not allow me access to [facility] scripts. I need to go to [facility] and get it. I sent a request, I just wanted to make sure it went through.  Thank you

## 2020-03-01 RX ORDER — HYDROCHLOROTHIAZIDE 25 MG/1
TABLET ORAL
Qty: 90 TAB | Refills: 4 | Status: SHIPPED | OUTPATIENT
Start: 2020-03-01 | End: 2021-03-15 | Stop reason: SDUPTHER

## 2020-05-05 ENCOUNTER — VIRTUAL VISIT (OUTPATIENT)
Dept: INTERNAL MEDICINE CLINIC | Facility: CLINIC | Age: 40
End: 2020-05-05

## 2020-05-05 VITALS — BODY MASS INDEX: 36.8 KG/M2 | HEIGHT: 62 IN | WEIGHT: 200 LBS

## 2020-05-05 DIAGNOSIS — F41.8 ANXIETY WITH DEPRESSION: Primary | ICD-10-CM

## 2020-05-05 RX ORDER — SERTRALINE HYDROCHLORIDE 100 MG/1
TABLET, FILM COATED ORAL
Qty: 30 TAB | Refills: 1 | Status: SHIPPED | OUTPATIENT
Start: 2020-05-05 | End: 2020-09-02 | Stop reason: SDUPTHER

## 2020-05-05 NOTE — PROGRESS NOTES
Farrukh Finch  Identified pt with two pt identifiers(name and ). Chief Complaint   Patient presents with    Medication Reaction     Nausea // Anxiety is not better - was better on a previous medication // Cymbalta       Reviewed record In preparation for visit and have obtained necessary documentation. 1. Have you been to the ER, urgent care clinic or hospitalized since your last visit? No     2. Have you seen or consulted any other health care providers outside of the [address] [place] [address] since your last visit? Include any pap smears or colon screening. No    Patient does not have an advance directive. Vitals reviewed with provider.     Health Maintenance reviewed:     Health Maintenance Due   Topic    PAP AKA CERVICAL CYTOLOGY     DTaP/Tdap/Td series (2 - Td)          Wt Readings from Last 3 Encounters:   20 200 lb (90.7 kg)   20 201 lb (91.2 kg)   19 191 lb (86.6 kg)        Temp Readings from Last 3 Encounters:   20 98.1 °F (36.7 °C) (Oral)   19 98.2 °F (36.8 °C) (Oral)   19 98.4 °F (36.9 °C) (Oral)        BP Readings from Last 3 Encounters:   20 125/81   19 120/80   19 120/85        Pulse Readings from Last 3 Encounters:   20 75   19 69   19 87        Vitals:    20 1535   Weight: 200 lb (90.7 kg)   Height: 5' 2\" (1.575 m)   PainSc:   0 - No pain   LMP: 2020          Learning Assessment:   :       Learning Assessment 2014   PRIMARY LEARNER Patient   HIGHEST LEVEL OF EDUCATION - PRIMARY LEARNER  4 YEARS OF COLLEGE   BARRIERS PRIMARY LEARNER NONE   PRIMARY LANGUAGE ENGLISH    NEED No   LEARNER PREFERENCE PRIMARY READING   ANSWERED BY patient   RELATIONSHIP SELF        Depression Screening:   :       3 most recent PHQ Screens 2020   PHQ Not Done -   Little interest or pleasure in doing things Nearly every day   Feeling down, depressed, irritable, or hopeless Nearly every day   Total Score PHQ 2 6   Trouble falling or staying asleep, or sleeping too much -   Feeling tired or having little energy -   Poor appetite, weight loss, or overeating -   Feeling bad about yourself - or that you are a failure or have let yourself or your family down -   Trouble concentrating on things such as school, work, reading, or watching TV -   Moving or speaking so slowly that other people could have noticed; or the opposite being so fidgety that others notice -   Thoughts of being better off dead, or hurting yourself in some way -   PHQ 9 Score -   How difficult have these problems made it for you to do your work, take care of your home and get along with others -        Fall Risk Assessment:   :       Fall Risk Assessment, last 12 mths 7/3/2019   Able to walk? Yes   Fall in past 12 months? No        Abuse Screening:   :       Abuse Screening Questionnaire 7/3/2019   Do you ever feel afraid of your partner? N   Are you in a relationship with someone who physically or mentally threatens you? N   Is it safe for you to go home?  Y        ADL Screening:   :       ADL Assessment 6/24/2015   Feeding yourself No Help Needed   Getting from bed to chair No Help Needed   Getting dressed No Help Needed   Bathing or showering No Help Needed   Walk across the room (includes cane/walker) No Help Needed   Using the telphone No Help Needed   Taking your medications No Help Needed   Preparing meals No Help Needed   Managing money (expenses/bills) No Help Needed   Moderately strenuous housework (laundry) No Help Needed   Shopping for personal items (toiletries/medicines) No Help Needed   Shopping for groceries No Help Needed   Driving No Help Needed   Climbing a flight of stairs No Help Needed   Getting to places beyond walking distances No Help Needed

## 2020-05-05 NOTE — PROGRESS NOTES
Stephanie Haddad is a 44 y.o. female evaluated via telephone on 5/5/2020. Consent:  She and/or health care decision maker is aware that she may receive a bill for this telephone service, depending on her insurance coverage, and has provided verbal consent to proceed: Yes      Documentation:  I communicated with the patient and/or health care decision maker about see below. Details of this discussion including any medical advice provided:     Has been on duloxetine for about 6 weeks. Says she has been feeling worse. Makes herself do things including activities with her nieces, children during the day but not feeling happy or enjoying it. Sleeps until 2pm when she doesn't have to get up , feeling nauseous all the time. Has been having more thoughts of harming herself, but contracts for safety. Got worse about 2 weeks ago. Still seeing her counselor and has appointment tomorrow - will discuss her symptoms with her. Has been getting really agitated at \"everything\". Has had difficulty sleeping at night - last night couldn't sleep at all. Plan: patient contracts for safety, says would never follow through with suicide  Will meet with counselor tomorrow  Stop cymbalta, start sertraline 50mg daily for one week then 100mg daily - will take 25mg now which she has at home    I affirm this is a Patient Initiated Episode with a Patient who has not had a related appointment within my department in the past 7 days or scheduled within the next 24 hours.     Total Time: minutes: 11-20 minutes    Note: not billable if this call serves to triage the patient into an appointment for the relevant concern      Rich Gudino MD

## 2020-09-02 NOTE — TELEPHONE ENCOUNTER
PCP: Livia Stark MD     Last appt: Visit date not found   No future appointments.      Requested Prescriptions     Pending Prescriptions Disp Refills    sertraline (ZOLOFT) 100 mg tablet 30 Tab 1     Simg daily for one week then 100mg

## 2020-09-02 NOTE — TELEPHONE ENCOUNTER
[facility] on file sent a fax requesting a refill of   Requested Prescriptions     Pending Prescriptions Disp Refills    sertraline (ZOLOFT) 100 mg tablet 30 Tab 1     Simg daily for one week then 100mg

## 2020-09-03 RX ORDER — SERTRALINE HYDROCHLORIDE 100 MG/1
TABLET, FILM COATED ORAL
Qty: 30 TAB | Refills: 1 | Status: SHIPPED | OUTPATIENT
Start: 2020-09-03 | End: 2020-09-16 | Stop reason: SDUPTHER

## 2020-09-11 DIAGNOSIS — I10 ESSENTIAL HYPERTENSION: Chronic | ICD-10-CM

## 2020-09-11 RX ORDER — POTASSIUM CHLORIDE 1500 MG/1
20 TABLET, FILM COATED, EXTENDED RELEASE ORAL 2 TIMES DAILY
Qty: 180 TAB | Refills: 1 | Status: SHIPPED | OUTPATIENT
Start: 2020-09-11 | End: 2021-03-15 | Stop reason: SDUPTHER

## 2020-09-11 NOTE — TELEPHONE ENCOUNTER
PCP: Raegan Guerrero MD     Last appt: Visit date not found   No future appointments. Requested Prescriptions     Pending Prescriptions Disp Refills    potassium chloride SR (K-TAB) 20 mEq tablet 180 Tab 1     Sig: Take 1 Tab by mouth two (2) times a day.

## 2020-09-16 RX ORDER — SERTRALINE HYDROCHLORIDE 100 MG/1
TABLET, FILM COATED ORAL
Qty: 30 TAB | Refills: 1 | Status: SHIPPED | OUTPATIENT
Start: 2020-09-16 | End: 2020-11-27 | Stop reason: SDUPTHER

## 2020-09-16 NOTE — TELEPHONE ENCOUNTER
PCP: Katarina Melo MD     Last appt: Visit date not found   No future appointments.      Requested Prescriptions     Pending Prescriptions Disp Refills    sertraline (ZOLOFT) 100 mg tablet 30 Tab 1     Simg daily for one week then 100mg

## 2020-11-27 ENCOUNTER — VIRTUAL VISIT (OUTPATIENT)
Dept: INTERNAL MEDICINE CLINIC | Age: 40
End: 2020-11-27

## 2020-11-27 DIAGNOSIS — F41.9 ANXIETY: Primary | Chronic | ICD-10-CM

## 2020-11-27 DIAGNOSIS — I10 ESSENTIAL HYPERTENSION: Chronic | ICD-10-CM

## 2020-11-27 PROCEDURE — 99441 PR PHYS/QHP TELEPHONE EVALUATION 5-10 MIN: CPT | Performed by: INTERNAL MEDICINE

## 2020-11-27 RX ORDER — LOSARTAN POTASSIUM 25 MG/1
TABLET ORAL
Qty: 90 TAB | Refills: 4 | Status: SHIPPED | OUTPATIENT
Start: 2020-11-27 | End: 2021-03-12

## 2020-11-27 RX ORDER — SERTRALINE HYDROCHLORIDE 100 MG/1
150 TABLET, FILM COATED ORAL DAILY
Qty: 45 TAB | Refills: 5 | Status: SHIPPED | OUTPATIENT
Start: 2020-11-27 | End: 2021-03-12 | Stop reason: DRUGHIGH

## 2020-11-27 RX ORDER — ALPRAZOLAM 0.5 MG/1
0.5 TABLET ORAL
Qty: 10 TAB | Refills: 1 | Status: SHIPPED | OUTPATIENT
Start: 2020-11-27

## 2020-11-27 NOTE — PROGRESS NOTES
Osito Sports  Identified pt with two pt identifiers(name and ). Chief Complaint   Patient presents with    Medication Refill       Reviewed record In preparation for visit and have obtained necessary documentation. 1. Have you been to the ER, urgent care clinic or hospitalized since your last visit? No     2. Have you seen or consulted any other health care providers outside of the [address] [place] [address] since your last visit? Include any pap smears or colon screening. No    Patient does not have an advance directive. Vitals reviewed with provider. Health Maintenance reviewed:     Health Maintenance Due   Topic    PAP AKA CERVICAL CYTOLOGY     DTaP/Tdap/Td series (2 - Td)    Lipid Screen           Wt Readings from Last 3 Encounters:   20 200 lb (90.7 kg)   20 201 lb (91.2 kg)   19 191 lb (86.6 kg)        Temp Readings from Last 3 Encounters:   20 98.1 °F (36.7 °C) (Oral)   19 98.2 °F (36.8 °C) (Oral)   19 98.4 °F (36.9 °C) (Oral)        BP Readings from Last 3 Encounters:   20 125/81   19 120/80   19 120/85        Pulse Readings from Last 3 Encounters:   20 75   19 69   19 87      There were no vitals filed for this visit.        Learning Assessment:   :       Learning Assessment 2014   PRIMARY LEARNER Patient   HIGHEST LEVEL OF EDUCATION - PRIMARY LEARNER  4 YEARS OF COLLEGE   BARRIERS PRIMARY LEARNER NONE   PRIMARY LANGUAGE ENGLISH    NEED No   LEARNER PREFERENCE PRIMARY READING   ANSWERED BY patient   RELATIONSHIP SELF        Depression Screening:   :       3 most recent PHQ Screens 2020   PHQ Not Done -   Little interest or pleasure in doing things Nearly every day   Feeling down, depressed, irritable, or hopeless Nearly every day   Total Score PHQ 2 6   Trouble falling or staying asleep, or sleeping too much -   Feeling tired or having little energy -   Poor appetite, weight loss, or overeating -   Feeling bad about yourself - or that you are a failure or have let yourself or your family down -   Trouble concentrating on things such as school, work, reading, or watching TV -   Moving or speaking so slowly that other people could have noticed; or the opposite being so fidgety that others notice -   Thoughts of being better off dead, or hurting yourself in some way -   PHQ 9 Score -   How difficult have these problems made it for you to do your work, take care of your home and get along with others -        Fall Risk Assessment:   :       Fall Risk Assessment, last 12 mths 7/3/2019   Able to walk? Yes   Fall in past 12 months? No        Abuse Screening:   :       Abuse Screening Questionnaire 7/3/2019   Do you ever feel afraid of your partner? N   Are you in a relationship with someone who physically or mentally threatens you? N   Is it safe for you to go home?  Y        ADL Screening:   :       ADL Assessment 6/24/2015   Feeding yourself No Help Needed   Getting from bed to chair No Help Needed   Getting dressed No Help Needed   Bathing or showering No Help Needed   Walk across the room (includes cane/walker) No Help Needed   Using the telphone No Help Needed   Taking your medications No Help Needed   Preparing meals No Help Needed   Managing money (expenses/bills) No Help Needed   Moderately strenuous housework (laundry) No Help Needed   Shopping for personal items (toiletries/medicines) No Help Needed   Shopping for groceries No Help Needed   Driving No Help Needed   Climbing a flight of stairs No Help Needed   Getting to places beyond walking distances No Help Needed

## 2020-11-27 NOTE — PROGRESS NOTES
Aileen Wahl is a 36 y.o. female, evaluated via audio-only technology on 11/27/2020 for Medication Refill      Assessment & Plan:     Diagnoses and all orders for this visit:    1. Anxiety  -     sertraline (ZOLOFT) 100 mg tablet; Take 1.5 Tabs by mouth daily.  -     ALPRAZolam (XANAX) 0.5 mg tablet; Take 1 Tab by mouth three (3) times daily as needed for Anxiety. Max Daily Amount: 1.5 mg. Not controlled, continue counseling, increase zoloft  2. Essential hypertension  -     losartan (COZAAR) 25 mg tablet; TAKE 1 TABLET DAILY  Refilled above -not checking BP    Follow-up and Dispositions    · Return in about 3 months (around 2/27/2021) for med nataliya.         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 Prior to Admission medications    Medication Sig Start Date End Date Taking? Authorizing Provider   sertraline (ZOLOFT) 100 mg tablet 50mg daily for one week then 100mg 9/16/20  Yes Mey Mosquera MD   potassium chloride SR (K-TAB) 20 mEq tablet Take 1 Tab by mouth two (2) times a day. 9/11/20  Yes Mey Mosquera MD   hydroCHLOROthiazide (HYDRODIURIL) 25 mg tablet TAKE 1 TABLET DAILY 3/1/20  Yes Mey Mosquera MD   losartan (COZAAR) 25 mg tablet TAKE 1 TABLET DAILY 1/6/20  Yes Mey Mosquera MD   ALPRAZolam Zimmerman [facility]) 0.5 mg tablet Take 1 Tab by mouth three (3) times daily as needed for Anxiety.  Max Daily Amount: 1.5 mg. 8/22/19  Yes Mey Mosquera MD         ROS  Per HPI  Patient-Reported Vitals 11/27/2020   Patient-Reported Weight 200lb   Patient-Reported LMP 11/21/2020        Art Large, who was evaluated through a patient-initiated, synchronous (real-time) audio only encounter, and/or her healthcare decision maker, is aware that it is a billable service, with coverage as determined by her insurance carrier. She provided verbal consent to proceed: Yes. She has not had a related appointment within my department in the past 7 days or scheduled within the next 24 hours.       Total Time: minutes: 5-10 minutes    Judy Moore MD

## 2020-12-10 ENCOUNTER — OFFICE VISIT (OUTPATIENT)
Dept: URGENT CARE | Age: 40
End: 2020-12-10

## 2020-12-10 VITALS — OXYGEN SATURATION: 99 % | RESPIRATION RATE: 16 BRPM | TEMPERATURE: 98.4 F | HEART RATE: 100 BPM

## 2020-12-10 DIAGNOSIS — Z20.822 EXPOSURE TO COVID-19 VIRUS: Primary | ICD-10-CM

## 2020-12-10 PROCEDURE — 99203 OFFICE O/P NEW LOW 30 MIN: CPT | Performed by: FAMILY MEDICINE

## 2020-12-13 LAB — SARS-COV-2, NAA: NOT DETECTED

## 2020-12-17 ENCOUNTER — TELEPHONE (OUTPATIENT)
Dept: INTERNAL MEDICINE CLINIC | Age: 40
End: 2020-12-17

## 2020-12-17 DIAGNOSIS — I10 ESSENTIAL HYPERTENSION: Chronic | ICD-10-CM

## 2020-12-17 RX ORDER — LOSARTAN POTASSIUM 50 MG/1
50 TABLET ORAL DAILY
Qty: 90 TAB | Refills: 0 | Status: SHIPPED | OUTPATIENT
Start: 2020-12-17 | End: 2021-03-15 | Stop reason: SDUPTHER

## 2020-12-17 NOTE — TELEPHONE ENCOUNTER
Pt called and stated that she has covid and is now having high bp she would like to speak to the nurse about what it high with bp

## 2020-12-17 NOTE — TELEPHONE ENCOUNTER
Verified patients name and date of birth. Advised patient per Dr Jennifer Rivas. Patient stated understanding.

## 2020-12-17 NOTE — TELEPHONE ENCOUNTER
Patient diagnosed with covid 3 days ago on Monday. Today her BP has been 150/100. She has been lying down for last 2 hours. She is concerned about BP and wants to know what to do. Her two children are also with her that also have covid and she has not help. Please advised regarding BP.

## 2020-12-17 NOTE — TELEPHONE ENCOUNTER
Increase losartan to 50mg daily - can take 2 of the 25mg tabs - if bp comes down can go back to 25mg daily - also sent new rx

## 2021-03-12 ENCOUNTER — VIRTUAL VISIT (OUTPATIENT)
Dept: INTERNAL MEDICINE CLINIC | Age: 41
End: 2021-03-12
Payer: COMMERCIAL

## 2021-03-12 DIAGNOSIS — F41.9 ANXIETY: Chronic | ICD-10-CM

## 2021-03-12 DIAGNOSIS — I10 ESSENTIAL HYPERTENSION: Primary | Chronic | ICD-10-CM

## 2021-03-12 PROCEDURE — 99214 OFFICE O/P EST MOD 30 MIN: CPT | Performed by: INTERNAL MEDICINE

## 2021-03-12 RX ORDER — SERTRALINE HYDROCHLORIDE 50 MG/1
50 TABLET, FILM COATED ORAL DAILY
Qty: 90 TAB | Refills: 0 | Status: SHIPPED | OUTPATIENT
Start: 2021-03-12 | End: 2021-08-18 | Stop reason: SDUPTHER

## 2021-03-12 NOTE — PROGRESS NOTES
Dhiraj Daurte  Identified pt with two pt identifiers(name and ). Chief Complaint   Patient presents with    Medication Evaluation       Reviewed record In preparation for visit and have obtained necessary documentation. 1. Have you been to the ER, urgent care clinic or hospitalized since your last visit? No     2. Have you seen or consulted any other health care providers outside of the [address] [place] [address] since your last visit? Include any pap smears or colon screening. No    Patient does not have an advance directive. Vitals reviewed with provider. Health Maintenance reviewed:     Health Maintenance Due   Topic    Hepatitis C Screening     COVID-19 Vaccine (1)    PAP AKA CERVICAL CYTOLOGY     DTaP/Tdap/Td series (2 - Td)    Lipid Screen      Abuse Screening Questionnaire 7/3/2019   Do you ever feel afraid of your partner?  N     Temp Readings from Last 3 Encounters:   12/10/20 98.4 °F (36.9 °C)   20 98.1 °F (36.7 °C) (Oral)   19 98.2 °F (36.8 °C) (Oral)   e t  BP Readings from Last 3 Encounters:   20 125/81   19 120/80   19 120/85   baron  Pulse Readings from Last 3 Encounters:   12/10/20 100   20 75   19 69   (lThere were no vitals filed for this visit.l items (toiletries/medicines) Learning Assessment 2014   PRIMARY LEARNER Patient   HIGHEST LEVEL OF EDUCATION - PRIMARY LEARNER  4 YEARS OF COLLEGE   BARRIERS PRIMARY LEARNER NONE   PRIMARY LANGUAGE ENGLISH    NEED No   LEARNER PREFERENCE PRIMARY READING   ANSWERED BY patient   RELATIONSHIP SELF   in a relationship with someone   3 most recent PHQ Screens 2020   PHQ Not Done -   Little interest or pleasure in doing things Nearly every day   Feeling down, depressed, irritable, or hopeless Nearly every day   Total Score PHQ 2 6   Trouble falling or staying asleep, or sleeping too much -   Feeling tired or having little energy -   Poor appetite, weight loss, or overeating - Feeling bad about yourself - or that you are a failure or have let yourself or your family down -   Trouble concentrating on things such as school, work, reading, or watching TV -   Moving or speaking so slowly that other people could have noticed; or the opposite being so fidgety that others notice -   Thoughts of being better off dead, or hurting yourself in some way -   PHQ 9 Score -   How difficult have these problems made it for you to do your work, take care of your home and get along with others -

## 2021-03-12 NOTE — PROGRESS NOTES
Oliver Juarez is a 36 y.o. female who was seen by synchronous (real-time) audio-video technology on 3/12/2021 for Medication Evaluation    This visit was completed virtually using doxy. me    Assessment & Plan:     Diagnoses and all orders for this visit:    1. Essential hypertension  -     METABOLIC PANEL, COMPREHENSIVE; Future  -     LIPID PANEL; Future    2. Anxiety  -     TSH AND FREE T4; Future  -     sertraline (ZOLOFT) 50 mg tablet; Take 1 Tab by mouth daily. will restart zoloft at lower dose, continue counseling, contracted for safety  She will make appt with psychiatry - [facility] - believe she may have ADD as well and needs eval for this as well as treatment for anxiety/depression  BP well controlled    >30 min spent  Follow-up and Dispositions    · Return in about 1 month (around 4/12/2021) for anxiety. Routing History              Subjective:     HTN - BP has been about 120s/70s when she checks BP at home    Anxiety with depression - stopped zoloft inadvertently because she didn't pay attention to her medications - has been off a few weeks - had discontinuation syndrome, better now but more anxious. Didn't think it helped depressive symptoms.      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 Authorizing Provider   sertraline (ZOLOFT) 50 mg tablet Take 1 Tab by mouth daily. 3/12/21  Yes Corbin Fitzgerald MD   losartan (COZAAR) 50 mg tablet Take 1 Tab by mouth daily. 12/17/20  Yes Corbin Fitzgerald MD   ALPRAZolam Teofilolucy Velazquezuti) 0.5 mg tablet Take 1 Tab by mouth three (3) times daily as needed for Anxiety. Max Daily Amount: 1.5 mg. 11/27/20  Yes Corbin Fitzgerald MD   potassium chloride SR (K-TAB) 20 mEq tablet Take 1 Tab by mouth two (2) times a day. 9/11/20  Yes Corbin Fitzgerald MD   hydroCHLOROthiazide (HYDRODIURIL) 25 mg tablet TAKE 1 TABLET DAILY 3/1/20  Yes Corbin Fitzgerald MD   sertraline (ZOLOFT) 100 mg tablet Take 1.5 Tabs by mouth daily.  11/27/20 3/12/21  Corbin Fitzgerald MD   losartan (COZAAR) 25 mg tablet TAKE 1 TABLET DAILY 11/27/20 3/12/21  Corbin Fitzgerald MD         ROS  Per HPI  Objective:     Patient-Reported Vitals 3/12/2021   Patient-Reported Weight 202lb   Patient-Reported Systolic  127   Patient-Reported Diastolic 82   Patient-Reported LMP -        [INSTRUCTIONS:  \"[x]\" Indicates a positive item  \"[]\" Indicates a negative item  -- DELETE ALL ITEMS NOT EXAMINED]    Constitutional: [x] Appears well-developed and well-nourished [] No apparent distress      [x] Abnormal - tearful, anxious  Mental status: [x] Alert and awake  [x] Oriented to person/place/time [x] Able to follow commands    [] Abnormal -     Eyes:   EOM    [x]  Normal    [] Abnormal -   Sclera  [x]  Normal    [] Abnormal -          Discharge [x]  None visible   [] Abnormal -     HENT: [x] Normocephalic, atraumatic  [] Abnormal -   [x] Mouth/Throat: Mucous membranes are moist    External Ears [x] Normal  [] Abnormal -    Neck: [x] No visualized mass [] Abnormal -     Pulmonary/Chest: [x] Respiratory effort normal   [x] No visualized signs of difficulty breathing or respiratory distress        [] Abnormal - Musculoskeletal:   [] Normal gait with no signs of ataxia         [x] Normal range of motion of neck        [] Abnormal -     Neurological:        [x] No Facial Asymmetry (Cranial nerve 7 motor function) (limited exam due to video visit)          [x] No gaze palsy        [] Abnormal -          Skin:        [x] No significant exanthematous lesions or discoloration noted on facial skin         [] Abnormal -            Psychiatric:       [] Normal Affect [x] Abnormal - anxious, tearful, good eye contact, consolable     [x] No Hallucinations    Other pertinent observable physical exam findings:-        We discussed the expected course, resolution and complications of the diagnosis(es) in detail. Medication risks, benefits, costs, interactions, and alternatives were discussed as indicated. I advised her to contact the office if her condition worsens, changes or fails to improve as anticipated. She expressed understanding with the diagnosis(es) and plan. Chava Correa, was evaluated through a synchronous (real-time) audio-video encounter. The patient (or guardian if applicable) is aware that this is a billable service. Verbal consent to proceed has been obtained within the past 12 months. The visit was conducted pursuant to the emergency declaration under the [facility] [address] authority and the [employer] General Act. Patient identification was verified, and a caregiver was present when appropriate. The patient was located in a state where the provider was credentialed to provide care.       Romeo Christie MD

## 2021-03-15 DIAGNOSIS — I10 ESSENTIAL HYPERTENSION: Chronic | ICD-10-CM

## 2021-03-15 RX ORDER — HYDROCHLOROTHIAZIDE 25 MG/1
TABLET ORAL
Qty: 90 TAB | Refills: 4 | Status: SHIPPED | OUTPATIENT
Start: 2021-03-15 | End: 2022-03-24 | Stop reason: SDUPTHER

## 2021-03-15 RX ORDER — POTASSIUM CHLORIDE 1500 MG/1
20 TABLET, FILM COATED, EXTENDED RELEASE ORAL 2 TIMES DAILY
Qty: 180 TAB | Refills: 1 | Status: SHIPPED | OUTPATIENT
Start: 2021-03-15 | End: 2021-08-18 | Stop reason: SDUPTHER

## 2021-03-15 RX ORDER — LOSARTAN POTASSIUM 50 MG/1
50 TABLET ORAL DAILY
Qty: 90 TAB | Refills: 0 | Status: SHIPPED | OUTPATIENT
Start: 2021-03-15 | End: 2021-06-08 | Stop reason: SDUPTHER

## 2021-03-15 NOTE — TELEPHONE ENCOUNTER
[facility] on file sent a fax requesting a refill of   Requested Prescriptions     Pending Prescriptions Disp Refills    hydroCHLOROthiazide (HYDRODIURIL) 25 mg tablet 90 Tab 4     Sig: TAKE 1 TABLET DAILY    losartan (COZAAR) 50 mg tablet 90 Tab 0     Sig: Take 1 Tab by mouth daily.  potassium chloride SR (K-TAB) 20 mEq tablet 180 Tab 1     Sig: Take 1 Tab by mouth two (2) times a day.

## 2021-03-15 NOTE — TELEPHONE ENCOUNTER
PCP: Ivelisse Hernandez MD    Last appt: 3/12/2021  Future Appointments   Date Time Provider Lamont Hewitt   4/16/2021  8:30 AM Ivelisse Hernandez MD [facility]       Requested Prescriptions     Pending Prescriptions Disp Refills    hydroCHLOROthiazide (HYDRODIURIL) 25 mg tablet 90 Tab 4     Sig: TAKE 1 TABLET DAILY    losartan (COZAAR) 50 mg tablet 90 Tab 0     Sig: Take 1 Tab by mouth daily.  potassium chloride SR (K-TAB) 20 mEq tablet 180 Tab 1     Sig: Take 1 Tab by mouth two (2) times a day.

## 2021-04-16 ENCOUNTER — VIRTUAL VISIT (OUTPATIENT)
Dept: INTERNAL MEDICINE CLINIC | Age: 41
End: 2021-04-16
Payer: COMMERCIAL

## 2021-04-16 DIAGNOSIS — I10 ESSENTIAL HYPERTENSION: Chronic | ICD-10-CM

## 2021-04-16 DIAGNOSIS — F41.9 ANXIETY: Primary | Chronic | ICD-10-CM

## 2021-04-16 PROCEDURE — 99213 OFFICE O/P EST LOW 20 MIN: CPT | Performed by: INTERNAL MEDICINE

## 2021-04-16 NOTE — PROGRESS NOTES
Romeo Whaley  Identified pt with two pt identifiers(name and ). Chief Complaint   Patient presents with    Anxiety       Reviewed record In preparation for visit and have obtained necessary documentation. 1. Have you been to the ER, urgent care clinic or hospitalized since your last visit? No     2. Have you seen or consulted any other health care providers outside of the [address] [place] [address] since your last visit? Include any pap smears or colon screening. No    Patient does not have an advance directive. Vitals reviewed with provider. Health Maintenance reviewed:     Health Maintenance Due   Topic    Hepatitis C Screening     PAP AKA CERVICAL CYTOLOGY     DTaP/Tdap/Td series (2 - Td)    Lipid Screen           Wt Readings from Last 3 Encounters:   20 200 lb (90.7 kg)   20 201 lb (91.2 kg)   19 191 lb (86.6 kg)        Temp Readings from Last 3 Encounters:   12/10/20 98.4 °F (36.9 °C)   20 98.1 °F (36.7 °C) (Oral)   19 98.2 °F (36.8 °C) (Oral)        BP Readings from Last 3 Encounters:   20 125/81   19 120/80   19 120/85        Pulse Readings from Last 3 Encounters:   12/10/20 100   20 75   19 69      There were no vitals filed for this visit.        Learning Assessment:   :       Learning Assessment 2014   PRIMARY LEARNER Patient   HIGHEST LEVEL OF EDUCATION - PRIMARY LEARNER  4 YEARS OF COLLEGE   BARRIERS PRIMARY LEARNER NONE   PRIMARY LANGUAGE ENGLISH    NEED No   LEARNER PREFERENCE PRIMARY READING   ANSWERED BY patient   RELATIONSHIP SELF        Depression Screening:   :       3 most recent PHQ Screens 2021   PHQ Not Done -   Little interest or pleasure in doing things Not at all   Feeling down, depressed, irritable, or hopeless Not at all   Total Score PHQ 2 0   Trouble falling or staying asleep, or sleeping too much -   Feeling tired or having little energy -   Poor appetite, weight loss, or overeating -   Feeling bad about yourself - or that you are a failure or have let yourself or your family down -   Trouble concentrating on things such as school, work, reading, or watching TV -   Moving or speaking so slowly that other people could have noticed; or the opposite being so fidgety that others notice -   Thoughts of being better off dead, or hurting yourself in some way -   PHQ 9 Score -   How difficult have these problems made it for you to do your work, take care of your home and get along with others -        Fall Risk Assessment:   :       Fall Risk Assessment, last 12 mths 7/3/2019   Able to walk? Yes   Fall in past 12 months? No        Abuse Screening:   :       Abuse Screening Questionnaire 7/3/2019   Do you ever feel afraid of your partner? N   Are you in a relationship with someone who physically or mentally threatens you? N   Is it safe for you to go home?  Y        ADL Screening:   :       ADL Assessment 6/24/2015   Feeding yourself No Help Needed   Getting from bed to chair No Help Needed   Getting dressed No Help Needed   Bathing or showering No Help Needed   Walk across the room (includes cane/walker) No Help Needed   Using the telphone No Help Needed   Taking your medications No Help Needed   Preparing meals No Help Needed   Managing money (expenses/bills) No Help Needed   Moderately strenuous housework (laundry) No Help Needed   Shopping for personal items (toiletries/medicines) No Help Needed   Shopping for groceries No Help Needed   Driving No Help Needed   Climbing a flight of stairs No Help Needed   Getting to places beyond walking distances No Help Needed

## 2021-04-16 NOTE — PROGRESS NOTES
Tylor Ellison is a 36 y.o. female who was seen by synchronous (real-time) audio-video technology on 4/16/2021 for Anxiety    This visit was completed virtually using doxy. me    Assessment & Plan:     Diagnoses and all orders for this visit:    1. Anxiety    2. Essential hypertension      Follow-up and Dispositions    · Return in about 6 months (around 10/16/2021) for bp, anxiety. BP has been controlled - patient will get labs    Anxiety reportedly better without medications - continue counseling, call for appt if worsens    Subjective: Anxiety - last visit plan:  will restart zoloft at lower dose, continue counseling, contracted for safety  She will make appt with psychiatry - [facility] - believe she may have ADD as well and needs eval for this as well as treatment for anxiety/depression    Decided not to take zoloft - no SI but while on zoloft said she felt this way daily on zoloft  Mood = \"good\"  Able to calm herself better, no panic attacks  Mood more even    Sleeping better    Still seeing counselor - didn't schedule with psychiatry    BP normally about 120/80    Says she is thinking more clearly, 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 Authorizing Provider   hydroCHLOROthiazide (HYDRODIURIL) 25 mg tablet TAKE 1 TABLET DAILY 3/15/21  Yes Krista Robison MD   losartan (COZAAR) 50 mg tablet Take 1 Tab by mouth daily. 3/15/21  Yes Krista Robison MD   potassium chloride SR (K-TAB) 20 mEq tablet Take 1 Tab by mouth two (2) times a day. 3/15/21   Krista Robison MD   sertraline (ZOLOFT) 50 mg tablet Take 1 Tab by mouth daily. 3/12/21   Krista Robison MD   ALPRAZolam Truddie Best) 0.5 mg tablet Take 1 Tab by mouth three (3) times daily as needed for Anxiety.  Max Daily Amount: 1.5 mg. 11/27/20   Krista Robison MD         ROS    Objective:     Patient-Reported Vitals 4/16/2021   Patient-Reported Weight 203lb   Patient-Reported Systolic  -   Patient-Reported Diastolic -   Patient-Reported LMP 04/13/2021        [INSTRUCTIONS:  \"[x]\" Indicates a positive item  \"[]\" Indicates a negative item  -- DELETE ALL ITEMS NOT EXAMINED]    Constitutional: [x] Appears well-developed and well-nourished [x] No apparent distress      [] Abnormal -     Mental status: [x] Alert and awake  [x] Oriented to person/place/time [x] Able to follow commands    [] Abnormal -     Eyes:   EOM    [x]  Normal    [] Abnormal -   Sclera  [x]  Normal    [] Abnormal -          Discharge [x]  None visible   [] Abnormal -     HENT: [x] Normocephalic, atraumatic  [] Abnormal -   [x] Mouth/Throat: Mucous membranes are moist    External Ears [x] Normal  [] Abnormal -    Neck: [x] No visualized mass [] Abnormal -     Pulmonary/Chest: [x] Respiratory effort normal   [x] No visualized signs of difficulty breathing or respiratory distress        [] Abnormal -      Musculoskeletal:   [x] Normal gait with no signs of ataxia         [x] Normal range of motion of neck        [] Abnormal -     Neurological:        [x] No Facial Asymmetry (Cranial nerve 7 motor function) (limited exam due to video visit)          [x] No gaze palsy        [] Abnormal -          Skin:        [x] No significant exanthematous lesions or discoloration noted on facial skin         [] Abnormal -            Psychiatric:       [x] Normal Affect [] Abnormal -        [x] No Hallucinations    Other pertinent observable physical exam findings:-        We discussed the expected course, resolution and complications of the diagnosis(es) in detail. Medication risks, benefits, costs, interactions, and alternatives were discussed as indicated. I advised her to contact the office if her condition worsens, changes or fails to improve as anticipated. She expressed understanding with the diagnosis(es) and plan.        Danny Kellogg, was evaluated through a synchronous (real-time) audio-video encounter. The patient (or guardian if applicable) is aware that this is a billable service. Verbal consent to proceed has been obtained within the past 12 months. The visit was conducted pursuant to the emergency declaration under the [facility], [address] [place] [address] authority and the [employer] and [employer] General Act. Patient identification was verified, and a caregiver was present when appropriate. The patient was located in a state where the provider was credentialed to provide care.       Beba Kitchen MD

## 2021-05-29 LAB — HBA1C MFR BLD HPLC: 5.1 %

## 2021-06-08 DIAGNOSIS — I10 ESSENTIAL HYPERTENSION: Primary | ICD-10-CM

## 2021-06-08 RX ORDER — LOSARTAN POTASSIUM 50 MG/1
50 TABLET ORAL DAILY
Qty: 90 TABLET | Refills: 0 | Status: SHIPPED | OUTPATIENT
Start: 2021-06-08 | End: 2022-03-24 | Stop reason: SDUPTHER

## 2021-06-08 NOTE — TELEPHONE ENCOUNTER
PCP: Marcela Ortiz MD    Last appt: 4/16/2021  No future appointments. Requested Prescriptions     Pending Prescriptions Disp Refills    losartan (COZAAR) 50 mg tablet 90 Tablet 0     Sig: Take 1 Tablet by mouth daily.

## 2021-06-08 NOTE — TELEPHONE ENCOUNTER
Requested Prescriptions     Pending Prescriptions Disp Refills    losartan (COZAAR) 50 mg tablet 90 Tablet 0     Sig: Take 1 Tablet by mouth daily.

## 2021-08-18 ENCOUNTER — OFFICE VISIT (OUTPATIENT)
Dept: INTERNAL MEDICINE CLINIC | Age: 41
End: 2021-08-18

## 2021-08-18 VITALS
RESPIRATION RATE: 16 BRPM | HEIGHT: 62 IN | OXYGEN SATURATION: 96 % | DIASTOLIC BLOOD PRESSURE: 84 MMHG | WEIGHT: 200 LBS | HEART RATE: 62 BPM | BODY MASS INDEX: 36.8 KG/M2 | SYSTOLIC BLOOD PRESSURE: 120 MMHG | TEMPERATURE: 98.2 F

## 2021-08-18 DIAGNOSIS — I10 ESSENTIAL HYPERTENSION: Primary | Chronic | ICD-10-CM

## 2021-08-18 DIAGNOSIS — F41.9 ANXIETY: Chronic | ICD-10-CM

## 2021-08-18 PROCEDURE — 99214 OFFICE O/P EST MOD 30 MIN: CPT | Performed by: INTERNAL MEDICINE

## 2021-08-18 RX ORDER — POTASSIUM CHLORIDE 1500 MG/1
20 TABLET, FILM COATED, EXTENDED RELEASE ORAL 2 TIMES DAILY
Qty: 180 TABLET | Refills: 1 | Status: SHIPPED | OUTPATIENT
Start: 2021-08-18 | End: 2022-03-24 | Stop reason: SDUPTHER

## 2021-08-18 RX ORDER — SERTRALINE HYDROCHLORIDE 50 MG/1
50 TABLET, FILM COATED ORAL DAILY
Qty: 90 TABLET | Refills: 1 | Status: SHIPPED | OUTPATIENT
Start: 2021-08-18 | End: 2022-03-24 | Stop reason: SDUPTHER

## 2021-08-18 NOTE — PROGRESS NOTES
Harsh Cui  Identified pt with two pt identifiers(name and ). Chief Complaint   Patient presents with    Medication Evaluation     Zoloft, stopped taking for a month and 1/2 started back taking it 2 months ago        Reviewed record In preparation for visit and have obtained necessary documentation. 1. Have you been to the ER, urgent care clinic or hospitalized since your last visit? No     2. Have you seen or consulted any other health care providers outside of the [address] [place] [address] since your last visit? Include any pap smears or colon screening. Frankie Damian 50, Short pump PAP     Patient does not have an advance directive. Vitals reviewed with provider.     Health Maintenance reviewed:     Health Maintenance Due   Topic    Hepatitis C Screening     COVID-19 Vaccine (1)    PAP AKA CERVICAL CYTOLOGY     DTaP/Tdap/Td series (2 - Td or Tdap)    Lipid Screen           Wt Readings from Last 3 Encounters:   21 200 lb (90.7 kg)   20 200 lb (90.7 kg)   20 201 lb (91.2 kg)        Temp Readings from Last 3 Encounters:   21 98.2 °F (36.8 °C) (Oral)   12/10/20 98.4 °F (36.9 °C)   20 98.1 °F (36.7 °C) (Oral)        BP Readings from Last 3 Encounters:   21 (!) 167/89   20 125/81   19 120/80        Pulse Readings from Last 3 Encounters:   21 62   12/10/20 100   20 75        Vitals:    21 1014   BP: (!) 167/89   Pulse: 62   Resp: 16   Temp: 98.2 °F (36.8 °C)   TempSrc: Oral   SpO2: 96%   Weight: 200 lb (90.7 kg)   Height: 5' 2\" (1.575 m)   PainSc:   0 - No pain          Learning Assessment:   :       Learning Assessment 2014   PRIMARY LEARNER Patient   HIGHEST LEVEL OF EDUCATION - PRIMARY LEARNER  4 YEARS OF COLLEGE   BARRIERS PRIMARY LEARNER NONE   PRIMARY LANGUAGE ENGLISH    NEED No   LEARNER PREFERENCE PRIMARY READING   ANSWERED BY patient   RELATIONSHIP SELF        Depression Screening:   :       3 most recent PHQ Screens 4/16/2021   PHQ Not Done -   Little interest or pleasure in doing things Not at all   Feeling down, depressed, irritable, or hopeless Not at all   Total Score PHQ 2 0   Trouble falling or staying asleep, or sleeping too much -   Feeling tired or having little energy -   Poor appetite, weight loss, or overeating -   Feeling bad about yourself - or that you are a failure or have let yourself or your family down -   Trouble concentrating on things such as school, work, reading, or watching TV -   Moving or speaking so slowly that other people could have noticed; or the opposite being so fidgety that others notice -   Thoughts of being better off dead, or hurting yourself in some way -   PHQ 9 Score -   How difficult have these problems made it for you to do your work, take care of your home and get along with others -        Fall Risk Assessment:   :       Fall Risk Assessment, last 12 mths 7/3/2019   Able to walk? Yes   Fall in past 12 months? No        Abuse Screening:   :       Abuse Screening Questionnaire 7/3/2019   Do you ever feel afraid of your partner? N   Are you in a relationship with someone who physically or mentally threatens you? N   Is it safe for you to go home?  Y        ADL Screening:   :       ADL Assessment 6/24/2015   Feeding yourself No Help Needed   Getting from bed to chair No Help Needed   Getting dressed No Help Needed   Bathing or showering No Help Needed   Walk across the room (includes cane/walker) No Help Needed   Using the telphone No Help Needed   Taking your medications No Help Needed   Preparing meals No Help Needed   Managing money (expenses/bills) No Help Needed   Moderately strenuous housework (laundry) No Help Needed   Shopping for personal items (toiletries/medicines) No Help Needed   Shopping for groceries No Help Needed   Driving No Help Needed   Climbing a flight of stairs No Help Needed   Getting to places beyond walking distances No Help Needed

## 2021-08-18 NOTE — PROGRESS NOTES
HPI  Ms. Ankit Feng is a 39y.o. year old female, she is seen today for follow up HTN, depression/anxiety. Starting taking zoloft about 2 mos ago because felt more depressed, tearful. Feeling more stable now since she's back on it. Has been having suicidal thoughts but agrees not to act on them. Difficulty focusing, feeling disorganized - basically as long as she can remember. Tends to procrastinate. Still seeing counselor. BP at home 120/80 normally. No chest pain or sob, doesn't take kcl daily due to cost.   Lots of stressors including disagreements with family. Chief Complaint   Patient presents with    Medication Evaluation     Zoloft, stopped taking for a month and 1/2 started back taking it 2 months ago         Prior to Admission medications    Medication Sig Start Date End Date Taking? Authorizing Provider   sertraline (ZOLOFT) 50 mg tablet Take 1 Tablet by mouth daily. 8/18/21  Yes Bang Cortez MD   potassium chloride SR (K-TAB) 20 mEq tablet Take 1 Tablet by mouth two (2) times a day. 8/18/21  Yes Bang Cortez MD   losartan (COZAAR) 50 mg tablet Take 1 Tablet by mouth daily. 6/8/21  Yes Bang Cortez MD   hydroCHLOROthiazide (HYDRODIURIL) 25 mg tablet TAKE 1 TABLET DAILY 3/15/21  Yes Bang Cortez MD   ALPRAZolam Leveda Silvius) 0.5 mg tablet Take 1 Tab by mouth three (3) times daily as needed for Anxiety. Max Daily Amount: 1.5 mg. 11/27/20  Yes Bang Cortez MD   potassium chloride SR (K-TAB) 20 mEq tablet Take 1 Tab by mouth two (2) times a day. 3/15/21 8/18/21  Bang Cortez MD   sertraline (ZOLOFT) 50 mg tablet Take 1 Tab by mouth daily.  3/12/21 8/18/21  Bang Cortez MD         No Known Allergies      REVIEW OF SYSTEMS:  Per HPI    PHYSICAL EXAM:  Visit Vitals  /84   Pulse 62   Temp 98.2 °F (36.8 °C) (Oral)   Resp 16   Ht 5' 2\" (1.575 m)   Wt 200 lb (90.7 kg)   SpO2 96%   BMI 36.58 kg/m²     Constitutional: Appears well-developed and well-nourished. No distress. HENT:   Head: Normocephalic and atraumatic. Eyes: No scleral icterus. Cardiovascular: Normal S1/S2, regular rhythm. No murmurs, rubs, or gallops. Pulmonary/Chest: Effort normal and breath sounds normal. No respiratory distress. No wheezes, rhonchi, or rales. Ext: No edema. Neurological: Alert. Psychiatric: tearful, consolable, good eye contact. Behavior is normal.     Lab Results   Component Value Date/Time    Sodium 139 07/03/2019 02:02 PM    Potassium 3.4 (L) 07/03/2019 02:02 PM    Chloride 100 07/03/2019 02:02 PM    CO2 26 07/03/2019 02:02 PM    Anion gap 10 08/25/2016 09:45 AM    Glucose 104 (H) 07/03/2019 02:02 PM    BUN 12 07/03/2019 02:02 PM    Creatinine 0.75 07/03/2019 02:02 PM    BUN/Creatinine ratio 16 07/03/2019 02:02 PM    GFR est  07/03/2019 02:02 PM    GFR est non- 07/03/2019 02:02 PM    Calcium 9.6 07/03/2019 02:02 PM    Bilirubin, total 0.3 01/15/2018 04:16 PM    Alk. phosphatase 59 01/15/2018 04:16 PM    Protein, total 7.5 01/15/2018 04:16 PM    Albumin 4.5 01/15/2018 04:16 PM    Globulin 3.5 08/25/2016 09:45 AM    A-G Ratio 1.5 01/15/2018 04:16 PM    ALT (SGPT) 17 01/15/2018 04:16 PM     No results found for: HBA1C, IBP4XPIV, JAX0KGCQ   Lab Results   Component Value Date/Time    Cholesterol, total 201 (H) 06/30/2011 08:29 AM    HDL Cholesterol 54 06/30/2011 08:29 AM    LDL, calculated 127 (H) 06/30/2011 08:29 AM    VLDL, calculated 20 06/30/2011 08:29 AM    Triglyceride 99 06/30/2011 08:29 AM          ASSESSMENT/PLAN  Diagnoses and all orders for this visit:    1. Essential hypertension  -     potassium chloride SR (K-TAB) 20 mEq tablet; Take 1 Tablet by mouth two (2) times a day. 2. Anxiety  -     sertraline (ZOLOFT) 50 mg tablet; Take 1 Tablet by mouth daily.       bp at goal - continue current medications  Will take kcl consistently - less expensive with goodrx  Continue zoloft and counseling, contracted for safety  Refer to psychiatry - or [facility] - for add testing    >30 min total spent on visit including review of previous notes, testing, obtaining history, exam, and counseling patient on diagnoses      Health Maintenance Due   Topic Date Due    Hepatitis C Screening  Never done    COVID-19 Vaccine (1) Never done    PAP AKA CERVICAL CYTOLOGY  09/12/2015    DTaP/Tdap/Td series (2 - Td or Tdap) 01/06/2020    Lipid Screen  07/16/2020        Follow-up and Dispositions    · Return in about 6 weeks (around 9/29/2021) for bp, med eval.            Reviewed plan of care. Patient has provided input and agrees with goals. The nurse provided the patient and/or family with advanced directive information if needed and encouraged the patient to provide a copy to the office when available.

## 2021-08-18 NOTE — PATIENT INSTRUCTIONS
[facility]  [address] [place] [address] Drive  [address] [facility], [address] [place]    Hours: Monday-Thursday 8:00-6:00    Phone: [phone]  Fax: [phone]    Microsite: [facility]. [facility]

## 2021-11-19 ENCOUNTER — PATIENT MESSAGE (OUTPATIENT)
Dept: INTERNAL MEDICINE CLINIC | Age: 41
End: 2021-11-19

## 2022-02-13 DIAGNOSIS — I10 ESSENTIAL HYPERTENSION: Chronic | ICD-10-CM

## 2022-02-14 ENCOUNTER — PATIENT MESSAGE (OUTPATIENT)
Dept: INTERNAL MEDICINE CLINIC | Age: 42
End: 2022-02-14

## 2022-02-14 RX ORDER — LOSARTAN POTASSIUM 25 MG/1
TABLET ORAL
Qty: 90 TABLET | Refills: 4 | OUTPATIENT
Start: 2022-02-14

## 2022-03-18 PROBLEM — E66.01 SEVERE OBESITY (HCC): Status: ACTIVE | Noted: 2020-02-24

## 2022-03-24 ENCOUNTER — OFFICE VISIT (OUTPATIENT)
Dept: INTERNAL MEDICINE CLINIC | Age: 42
End: 2022-03-24
Payer: COMMERCIAL

## 2022-03-24 VITALS
DIASTOLIC BLOOD PRESSURE: 86 MMHG | TEMPERATURE: 97.6 F | HEART RATE: 75 BPM | SYSTOLIC BLOOD PRESSURE: 132 MMHG | WEIGHT: 196 LBS | RESPIRATION RATE: 18 BRPM | OXYGEN SATURATION: 98 % | BODY MASS INDEX: 36.07 KG/M2 | HEIGHT: 62 IN

## 2022-03-24 DIAGNOSIS — F41.9 ANXIETY: Chronic | ICD-10-CM

## 2022-03-24 DIAGNOSIS — I10 PRIMARY HYPERTENSION: Primary | ICD-10-CM

## 2022-03-24 DIAGNOSIS — I10 ESSENTIAL HYPERTENSION: ICD-10-CM

## 2022-03-24 DIAGNOSIS — Z11.59 ENCOUNTER FOR HEPATITIS C SCREENING TEST FOR LOW RISK PATIENT: ICD-10-CM

## 2022-03-24 DIAGNOSIS — R53.82 CHRONIC FATIGUE: ICD-10-CM

## 2022-03-24 DIAGNOSIS — Z11.3 ROUTINE SCREENING FOR STI (SEXUALLY TRANSMITTED INFECTION): ICD-10-CM

## 2022-03-24 PROCEDURE — 99214 OFFICE O/P EST MOD 30 MIN: CPT | Performed by: INTERNAL MEDICINE

## 2022-03-24 RX ORDER — LOSARTAN POTASSIUM 50 MG/1
50 TABLET ORAL DAILY
Qty: 90 TABLET | Refills: 1 | Status: SHIPPED | OUTPATIENT
Start: 2022-03-24

## 2022-03-24 RX ORDER — HYDROCHLOROTHIAZIDE 25 MG/1
TABLET ORAL
Qty: 90 TABLET | Refills: 1 | Status: SHIPPED | OUTPATIENT
Start: 2022-03-24 | End: 2022-10-11

## 2022-03-24 RX ORDER — NORGESTREL AND ETHINYL ESTRADIOL 0.3-0.03MG
1 KIT ORAL DAILY
COMMUNITY
Start: 2022-03-08 | End: 2022-09-21

## 2022-03-24 RX ORDER — SERTRALINE HYDROCHLORIDE 50 MG/1
50 TABLET, FILM COATED ORAL DAILY
Qty: 90 TABLET | Refills: 1 | Status: SHIPPED | OUTPATIENT
Start: 2022-03-24 | End: 2022-09-21 | Stop reason: DRUGHIGH

## 2022-03-24 RX ORDER — POTASSIUM CHLORIDE 1500 MG/1
20 TABLET, FILM COATED, EXTENDED RELEASE ORAL 2 TIMES DAILY
Qty: 180 TABLET | Refills: 1 | Status: SHIPPED | OUTPATIENT
Start: 2022-03-24

## 2022-03-24 NOTE — PROGRESS NOTES
HPI  Ms. Mariajose Tovar is a 39y.o. year old female, she is seen today for follow up HTN, has been out of losartan for about 2 weeks. Went to ob/gyn few weeks ago down to llq abdominal pain, told she had ovarian cysts x 2. One is dermoid cyst.  Now on ocp per gyn but still has pain. Has been tired for so long, trying to spend time with friends, [employer]od[employer] - this has been helping her mood. Feeling more positive. 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 No symptoms of vaginal discharge, but worried about STI. Last encounter about 2 weeks ago. No longer in relationship with this man. Chief Complaint   Patient presents with    Fatigue     Patient reports ovarian cyst and brown discharge spontaneous     Medication Refill     losartan         Prior to Admission medications    Medication Sig Start Date End Date Taking? Authorizing Provider   Low-Ogestrel, 28, 0.3-30 mg-mcg tab Take 1 Tablet by mouth daily. 3/8/22  Yes Provider, Historical   losartan (COZAAR) 50 mg tablet Take 1 Tablet by mouth daily. 3/24/22  Yes Leanna Martell MD   sertraline (ZOLOFT) 50 mg tablet Take 1 Tablet by mouth daily. 3/24/22  Yes Leanna Martell MD   hydroCHLOROthiazide (HYDRODIURIL) 25 mg tablet TAKE 1 TABLET DAILY 3/24/22  Yes Leanna Martell MD   potassium chloride SR (K-TAB) 20 mEq tablet Take 1 Tablet by mouth two (2) times a day. 3/24/22  Yes Leanna Martell MD   ALPRAZolam Rosemarie Talamantes) 0.5 mg tablet Take 1 Tab by mouth three (3) times daily as needed for Anxiety. Max Daily Amount: 1.5 mg. 11/27/20  Yes Leanna Martell MD   sertraline (ZOLOFT) 50 mg tablet Take 1 Tablet by mouth daily. 8/18/21 3/24/22  Leanna Martell MD   potassium chloride SR (K-TAB) 20 mEq tablet Take 1 Tablet by mouth two (2) times a day. 8/18/21 3/24/22  Leanna Martell MD   losartan (COZAAR) 50 mg tablet Take 1 Tablet by mouth daily.  6/8/21 3/24/22  Leanna Martell MD hydroCHLOROthiazide (HYDRODIURIL) 25 mg tablet TAKE 1 TABLET DAILY 3/15/21 3/24/22  Theresa Spears MD         No Known Allergies      REVIEW OF SYSTEMS:  Per HPI    PHYSICAL EXAM:  Visit Vitals  /86 (BP 1 Location: Right arm, BP Patient Position: Sitting, BP Cuff Size: Adult)   Pulse 75   Temp 97.6 °F (36.4 °C)   Resp 18   Ht 5' 2\" (1.575 m)   Wt 196 lb (88.9 kg)   SpO2 98%   BMI 35.85 kg/m²     Constitutional: Appears well-developed and well-nourished. No distress. HENT:   Head: Normocephalic and atraumatic. Eyes: No scleral icterus. Neck: no lad, no tm, supple   Cardiovascular: Normal S1/S2, regular rhythm. No murmurs, rubs, or gallops. Pulmonary/Chest: Effort normal and breath sounds normal. No respiratory distress. No wheezes, rhonchi, or rales. Abdomen: Soft, mild tenderness LLQ/ND, +BS, no rebound or guarding, no masses, no HSM appreciated. Ext: No edema. Neurological: Alert. Psychiatric: Normal mood and affect. Behavior is normal.     Lab Results   Component Value Date/Time    Sodium 139 07/03/2019 02:02 PM    Potassium 3.4 (L) 07/03/2019 02:02 PM    Chloride 100 07/03/2019 02:02 PM    CO2 26 07/03/2019 02:02 PM    Anion gap 10 08/25/2016 09:45 AM    Glucose 104 (H) 07/03/2019 02:02 PM    BUN 12 07/03/2019 02:02 PM    Creatinine 0.75 07/03/2019 02:02 PM    BUN/Creatinine ratio 16 07/03/2019 02:02 PM    GFR est  07/03/2019 02:02 PM    GFR est non- 07/03/2019 02:02 PM    Calcium 9.6 07/03/2019 02:02 PM    Bilirubin, total 0.3 01/15/2018 04:16 PM    Alk.  phosphatase 59 01/15/2018 04:16 PM    Protein, total 7.5 01/15/2018 04:16 PM    Albumin 4.5 01/15/2018 04:16 PM    Globulin 3.5 08/25/2016 09:45 AM    A-G Ratio 1.5 01/15/2018 04:16 PM    ALT (SGPT) 17 01/15/2018 04:16 PM     No results found for: HBA1C, HPS6WXYU, PUQ0SWBX   Lab Results   Component Value Date/Time    Cholesterol, total 201 (H) 06/30/2011 08:29 AM    HDL Cholesterol 54 06/30/2011 08:29 AM    LDL, calculated 127 (H) 06/30/2011 08:29 AM    VLDL, calculated 20 06/30/2011 08:29 AM    Triglyceride 99 06/30/2011 08:29 AM          ASSESSMENT/PLAN  Diagnoses and all orders for this visit:    1. Primary hypertension  -     METABOLIC PANEL, COMPREHENSIVE; Future  -     LIPID PANEL; Future    2. Essential hypertension  -     losartan (COZAAR) 50 mg tablet; Take 1 Tablet by mouth daily. -     hydroCHLOROthiazide (HYDRODIURIL) 25 mg tablet; TAKE 1 TABLET DAILY  -     potassium chloride SR (K-TAB) 20 mEq tablet; Take 1 Tablet by mouth two (2) times a day. 3. Anxiety  -     sertraline (ZOLOFT) 50 mg tablet; Take 1 Tablet by mouth daily. 4. Routine screening for STI (sexually transmitted infection)  -     HIV 1/2 AG/AB, 4TH GENERATION,W RFLX CONFIRM; Future  -     CT/NG/T.VAGINALIS AMPLIFICATION; Future    5. Encounter for hepatitis C screening test for low risk patient  -     HCV AB W/RFLX TO DOYLE; Future    6. Chronic fatigue  -     CBC WITH AUTOMATED DIFF; Future  -     TSH 3RD GENERATION; Future  -     T4, FREE; Future    Blood pressure is close to goal, refill medications. Check labs as noted above. Anxiety stable, continue sertraline. Rule out anemia and hypothyroidism for fatigue. Encouraged follow-up with Gyn for ovarian cysts      Health Maintenance Due   Topic Date Due    Hepatitis C Screening  Never done    COVID-19 Vaccine (1) Never done    Cervical cancer screen  Never done    DTaP/Tdap/Td series (2 - Td or Tdap) 01/06/2020    Lipid Screen  07/16/2020    Flu Vaccine (1) 09/01/2021               Reviewed plan of care. Patient has provided input and agrees with goals. The nurse provided the patient and/or family with advanced directive information if needed and encouraged the patient to provide a copy to the office when available.

## 2022-03-24 NOTE — PROGRESS NOTES
Identified pt with two pt identifiers(name and ). Chief Complaint   Patient presents with    Fatigue     Patient reports ovarian cyst and brown discharge spontaneous     Medication Refill     losartan       Vitals:    22 0915   BP: (!) 151/106   Pulse: 75   Resp: 18   Temp: 97.6 °F (36.4 °C)   SpO2: 98%   Weight: 196 lb (88.9 kg)   Height: 5' 2\" (1.575 m)   PainSc:   5   PainLoc: Abdomen      Health Maintenance Due   Topic    Hepatitis C Screening     COVID-19 Vaccine (1)    Cervical cancer screen     DTaP/Tdap/Td series (2 - Td or Tdap)    Lipid Screen     Flu Vaccine (1)       Depression Screening:  :     3 most recent PHQ Screens 3/24/2022   PHQ Not Done -   Little interest or pleasure in doing things Not at all   Feeling down, depressed, irritable, or hopeless More than half the days   Total Score PHQ 2 2   Trouble falling or staying asleep, or sleeping too much -   Feeling tired or having little energy -   Poor appetite, weight loss, or overeating -   Feeling bad about yourself - or that you are a failure or have let yourself or your family down -   Trouble concentrating on things such as school, work, reading, or watching TV -   Moving or speaking so slowly that other people could have noticed; or the opposite being so fidgety that others notice -   Thoughts of being better off dead, or hurting yourself in some way -   PHQ 9 Score -   How difficult have these problems made it for you to do your work, take care of your home and get along with others -        Fall Risk Assessment:  :     Fall Risk Assessment, last 12 mths 7/3/2019   Able to walk? Yes   Fall in past 12 months? No       Abuse Screening:  :     Abuse Screening Questionnaire 7/3/2019   Do you ever feel afraid of your partner? N   Are you in a relationship with someone who physically or mentally threatens you? N   Is it safe for you to go home?  Y        Coordination of Care Questionnaire:  :     1. Have you been to the ER, urgent care clinic since your last visit? Hospitalized since your last visit? No    2. Have you seen or consulted any other health care providers outside of the [address] [place] [address] since your last visit? Include any pap smears or colon screening.    No

## 2022-03-26 LAB
C TRACH RRNA SPEC QL NAA+PROBE: NEGATIVE
N GONORRHOEA RRNA SPEC QL NAA+PROBE: NEGATIVE
T VAGINALIS RRNA SPEC QL NAA+PROBE: NEGATIVE

## 2022-09-21 ENCOUNTER — NURSE TRIAGE (OUTPATIENT)
Dept: OTHER | Facility: CLINIC | Age: 42
End: 2022-09-21

## 2022-09-21 ENCOUNTER — VIRTUAL VISIT (OUTPATIENT)
Dept: INTERNAL MEDICINE CLINIC | Age: 42
End: 2022-09-21
Payer: COMMERCIAL

## 2022-09-21 DIAGNOSIS — F41.9 ANXIETY: Primary | Chronic | ICD-10-CM

## 2022-09-21 DIAGNOSIS — Z13.31 POSITIVE DEPRESSION SCREENING: ICD-10-CM

## 2022-09-21 DIAGNOSIS — G44.86 CERVICOGENIC HEADACHE: ICD-10-CM

## 2022-09-21 DIAGNOSIS — S16.1XXA STRAIN OF NECK MUSCLE, INITIAL ENCOUNTER: ICD-10-CM

## 2022-09-21 PROCEDURE — 99214 OFFICE O/P EST MOD 30 MIN: CPT | Performed by: INTERNAL MEDICINE

## 2022-09-21 RX ORDER — CYCLOBENZAPRINE HCL 10 MG
10 TABLET ORAL
Qty: 30 TABLET | Refills: 0 | Status: SHIPPED | OUTPATIENT
Start: 2022-09-21

## 2022-09-21 RX ORDER — SERTRALINE HYDROCHLORIDE 100 MG/1
100 TABLET, FILM COATED ORAL DAILY
Qty: 90 TABLET | Refills: 2 | Status: SHIPPED | OUTPATIENT
Start: 2022-09-21

## 2022-09-21 RX ORDER — BUTALBITAL, ACETAMINOPHEN AND CAFFEINE 50; 325; 40 MG/1; MG/1; MG/1
1 TABLET ORAL
Qty: 30 TABLET | Refills: 1 | Status: SHIPPED | OUTPATIENT
Start: 2022-09-21

## 2022-09-21 NOTE — PROGRESS NOTES
Radha Fleming  Identified pt with two pt identifiers(name and ). Chief Complaint   Patient presents with    Migraine     Pain - 6        Reviewed record In preparation for visit and have obtained necessary documentation. 1. Have you been to the ER, urgent care clinic or hospitalized since your last visit? No     2. Have you seen or consulted any other health care providers outside of the [address] [place] [address] since your last visit? Include any pap smears or colon screening. Yes. [facility]     Patient does not have an advance directive. Vitals reviewed with provider. Health Maintenance reviewed:     Health Maintenance Due   Topic    Hepatitis C Screening     Cervical cancer screen     Lipid Screen     COVID-19 Vaccine (2 - Booster for Saba series)    Flu Vaccine (1)          Wt Readings from Last 3 Encounters:   22 196 lb (88.9 kg)   21 200 lb (90.7 kg)   20 200 lb (90.7 kg)        Temp Readings from Last 3 Encounters:   22 97.6 °F (36.4 °C)   21 98.2 °F (36.8 °C) (Oral)   12/10/20 98.4 °F (36.9 °C)        BP Readings from Last 3 Encounters:   22 132/86   21 120/84   20 125/81        Pulse Readings from Last 3 Encounters:   22 75   21 62   12/10/20 100      There were no vitals filed for this visit.        Learning Assessment:   :       Learning Assessment 2014   PRIMARY LEARNER Patient   HIGHEST LEVEL OF EDUCATION - PRIMARY LEARNER  4 YEARS OF COLLEGE   BARRIERS PRIMARY LEARNER NONE   PRIMARY LANGUAGE ENGLISH    NEED No   LEARNER PREFERENCE PRIMARY READING   ANSWERED BY patient   RELATIONSHIP SELF        Depression Screening:   :       3 most recent PHQ Screens 3/24/2022   PHQ Not Done -   Little interest or pleasure in doing things Not at all   Feeling down, depressed, irritable, or hopeless More than half the days   Total Score PHQ 2 2   Trouble falling or staying asleep, or sleeping too much - Feeling tired or having little energy -   Poor appetite, weight loss, or overeating -   Feeling bad about yourself - or that you are a failure or have let yourself or your family down -   Trouble concentrating on things such as school, work, reading, or watching TV -   Moving or speaking so slowly that other people could have noticed; or the opposite being so fidgety that others notice -   Thoughts of being better off dead, or hurting yourself in some way -   PHQ 9 Score -   How difficult have these problems made it for you to do your work, take care of your home and get along with others -        Fall Risk Assessment:   :       Fall Risk Assessment, last 12 mths 7/3/2019   Able to walk? Yes   Fall in past 12 months? No        Abuse Screening:   :       Abuse Screening Questionnaire 7/3/2019   Do you ever feel afraid of your partner? N   Are you in a relationship with someone who physically or mentally threatens you? N   Is it safe for you to go home?  Y        ADL Screening:   :       ADL Assessment 6/24/2015   Feeding yourself No Help Needed   Getting from bed to chair No Help Needed   Getting dressed No Help Needed   Bathing or showering No Help Needed   Walk across the room (includes cane/walker) No Help Needed   Using the telphone No Help Needed   Taking your medications No Help Needed   Preparing meals No Help Needed   Managing money (expenses/bills) No Help Needed   Moderately strenuous housework (laundry) No Help Needed   Shopping for personal items (toiletries/medicines) No Help Needed   Shopping for groceries No Help Needed   Driving No Help Needed   Climbing a flight of stairs No Help Needed   Getting to places beyond walking distances No Help Needed

## 2022-09-21 NOTE — TELEPHONE ENCOUNTER
Received call from Lizeth Astorga at [facility] with Red Flag Complaint. Subjective: Caller states \"Having some migraines for past 3 weeks and are the worst headaches of my life. \"     Current Symptoms: migraines intermittent, on right side of head, aura at times, tinnitus intermittent right ear, nausea    Onset: 3 weeks ago; intermittent    Associated Symptoms: reduced activity    Pain Severity: 8/10; burning; intermittent (2/10 with medication)    Temperature: Denies      What has been tried: OTC medication won't go away. LMP: NA Pregnant: NA    Recommended disposition: See in Office Today or tomorrow    Care advice provided, patient verbalizes understanding; denies any other questions or concerns; instructed to call back for any new or worsening symptoms. Patient/caller disagrees with disposition, states whats to come in Friday 9/23/22 for appointment d/t conflicts with work schedule. Writer discussed risks for waiting for treatment, patient stated understanding but with scheduling conflicts if couldn't come in Friday, would not come in at all. Writer attempted x2 to contact [facility] for approval.  Tiny Ibarra at [facility] stated would approve appointment for today or Friday but Dr Deep Ray is out and would only do VV. Patient/caller agrees. Writer provided warm transfer to Shay Luis at [facility] for scheduling. Attention Provider: Thank you for allowing me to participate in the care of your patient. The patient was connected to triage in response to information provided to the [facility]. Please do not respond through this encounter as the response is not directed to a shared pool.     Reason for Disposition   Unexplained headache that is present > 24 hours    Protocols used: Headache-ADULT-[place]

## 2022-09-21 NOTE — PROGRESS NOTES
Daniel Shields is a 43 y.o. female who was seen by synchronous (real-time) audio-video technology on 9/21/2022 for Migraine (Pain - 6 )        Assessment & Plan:   Diagnoses and all orders for this visit:    1. Anxiety  -     sertraline (ZOLOFT) 100 mg tablet; Take 1 Tablet by mouth daily. 2. Positive depression screening    3. Strain of neck muscle, initial encounter  -     cyclobenzaprine (FLEXERIL) 10 mg tablet; Take 1 Tablet by mouth three (3) times daily as needed for Muscle Spasm(s). 4. Cervicogenic headache  -     butalbital-acetaminophen-caffeine (FIORICET, ESGIC) -40 mg per tablet; Take 1 Tablet by mouth every six (6) hours as needed for Headache. Indications: tension headache    anxiety worse - increase zoloft as above  Suspect anxiety with muscle strain/spasm contributing to HA  Add flexeril for spasm, fioricet for HA        Subjective:     Head started hurting about 3 weeks ago on her first day of work. Feels like burning pain on right side of head, ears ringing, sometimes right eye with visual aura. Not better with rest, worse at night. Sometimes nausea, +light sensitivity and sound. From right side of top of head, down rigth side of neck to shoulder. Not getting much sleep due to work and children. Difficulty focusing at work, exhausted by end of day. 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 Has to spend weekends working    Excedrin, advil only helps temporarily    Stressed, not so much down. Not much time to herself. Working second job - [employer] - 3 days in evenings    Prior to Admission medications    Medication Sig Start Date End Date Taking? Authorizing Provider   butalbital-acetaminophen-caffeine (FIORICET, ESGIC) -40 mg per tablet Take 1 Tablet by mouth every six (6) hours as needed for Headache.  Indications: tension headache 9/21/22  Yes Mariluz Kathleen MD   cyclobenzaprine (FLEXERIL) 10 mg tablet Take 1 Tablet by mouth three (3) times daily as needed for Muscle Spasm(s). 9/21/22  Yes Henrietta Sal MD   sertraline (ZOLOFT) 100 mg tablet Take 1 Tablet by mouth daily. 9/21/22  Yes Henrietta Sal MD   losartan (COZAAR) 50 mg tablet Take 1 Tablet by mouth daily. 3/24/22  Yes Henrietta Sal MD   hydroCHLOROthiazide (HYDRODIURIL) 25 mg tablet TAKE 1 TABLET DAILY 3/24/22  Yes Henrietta Sal MD   potassium chloride SR (K-TAB) 20 mEq tablet Take 1 Tablet by mouth two (2) times a day. 3/24/22  Yes Henrietta Sal MD   ALPRAZolam Maryan Gather) 0.5 mg tablet Take 1 Tab by mouth three (3) times daily as needed for Anxiety. Max Daily Amount: 1.5 mg. 11/27/20  Yes Henrietta Sal MD   Low-Ogestrel, 28, 0.3-30 mg-mcg tab Take 1 Tablet by mouth daily. Patient not taking: Reported on 9/21/2022 3/8/22 9/21/22  Provider, Historical   sertraline (ZOLOFT) 50 mg tablet Take 1 Tablet by mouth daily.  3/24/22 9/21/22  Henrietta Sal MD         ROS  Per HPI  Objective:     Patient-Reported Vitals 9/21/2022   Patient-Reported Weight 195lb   Patient-Reported Systolic  -   Patient-Reported Diastolic -   Patient-Reported LMP 09.12.2022        [INSTRUCTIONS:  \"[x]\" Indicates a positive item  \"[]\" Indicates a negative item  -- DELETE ALL ITEMS NOT EXAMINED]    Constitutional: [x] Appears well-developed and well-nourished [] No apparent distress      [x] Abnormal - anxious    Mental status: [x] Alert and awake  [x] Oriented to person/place/time [x] Able to follow commands    [] Abnormal -     Eyes:   EOM    [x]  Normal    [] Abnormal -   Sclera  [x]  Normal    [] Abnormal -          Discharge [x]  None visible   [] Abnormal -     HENT: [x] Normocephalic, atraumatic  [] Abnormal -   [x] Mouth/Throat: Mucous membranes are moist    External Ears [x] Normal  [] Abnormal -    Neck: [x] No visualized mass [] Abnormal -     Pulmonary/Chest: [x] Respiratory effort normal   [x] No visualized signs of difficulty breathing or respiratory distress        [] Abnormal -      Musculoskeletal:   [] Normal gait with no signs of ataxia         [x] Normal range of motion of neck        [] Abnormal -     Neurological:        [x] No Facial Asymmetry (Cranial nerve 7 motor function) (limited exam due to video visit)          [x] No gaze palsy        [] Abnormal -          Skin:        [x] No significant exanthematous lesions or discoloration noted on facial skin         [] Abnormal -            Psychiatric:       [x] Normal Affect [] Abnormal -        [x] No Hallucinations    Other pertinent observable physical exam findings:-        We discussed the expected course, resolution and complications of the diagnosis(es) in detail. Medication risks, benefits, costs, interactions, and alternatives were discussed as indicated. I advised her to contact the office if her condition worsens, changes or fails to improve as anticipated. She expressed understanding with the diagnosis(es) and plan. Ary Scott, was evaluated through a synchronous (real-time) audio-video encounter. The patient (or guardian if applicable) is aware that this is a billable service, which includes applicable co-pays. This Virtual Visit was conducted with patient's (and/or legal guardian's) consent. The visit was conducted pursuant to the emergency declaration under the [address] [place] [address] authority and the [employer] and [employer] General Act. Patient identification was verified, and a caregiver was present when appropriate.   The patient was located at: [place] [phone]  The provider was located at: Home: [unfilled]        Va Ramirez MD

## 2022-09-22 ENCOUNTER — TELEPHONE (OUTPATIENT)
Dept: INTERNAL MEDICINE CLINIC | Age: 42
End: 2022-09-22

## 2022-09-22 RX ORDER — SUMATRIPTAN 50 MG/1
50 TABLET, FILM COATED ORAL
Qty: 9 TABLET | Refills: 0 | Status: SHIPPED | OUTPATIENT
Start: 2022-09-22 | End: 2022-09-22

## 2022-09-22 NOTE — TELEPHONE ENCOUNTER
Patient called regarding medication prescribed for migraine. Patient has taken it twice and not working.  Fioricet- not working

## 2022-09-22 NOTE — TELEPHONE ENCOUNTER
Rx for imitrex sent to rx- also please have her schedule appt with neurology - give her number to [facility], but see me again or to ER if worsening pain

## 2022-09-23 ENCOUNTER — APPOINTMENT (OUTPATIENT)
Dept: CT IMAGING | Age: 42
End: 2022-09-23
Attending: STUDENT IN AN ORGANIZED HEALTH CARE EDUCATION/TRAINING PROGRAM
Payer: COMMERCIAL

## 2022-09-23 ENCOUNTER — OFFICE VISIT (OUTPATIENT)
Dept: URGENT CARE | Age: 42
End: 2022-09-23

## 2022-09-23 ENCOUNTER — HOSPITAL ENCOUNTER (EMERGENCY)
Age: 42
Discharge: HOME | End: 2022-09-23
Attending: STUDENT IN AN ORGANIZED HEALTH CARE EDUCATION/TRAINING PROGRAM
Payer: COMMERCIAL

## 2022-09-23 VITALS
OXYGEN SATURATION: 100 % | DIASTOLIC BLOOD PRESSURE: 115 MMHG | BODY MASS INDEX: 36.8 KG/M2 | WEIGHT: 200 LBS | HEIGHT: 62 IN | HEART RATE: 78 BPM | SYSTOLIC BLOOD PRESSURE: 174 MMHG | RESPIRATION RATE: 16 BRPM | TEMPERATURE: 98.7 F

## 2022-09-23 VITALS
WEIGHT: 200 LBS | RESPIRATION RATE: 16 BRPM | HEART RATE: 79 BPM | BODY MASS INDEX: 36.8 KG/M2 | HEIGHT: 62 IN | DIASTOLIC BLOOD PRESSURE: 91 MMHG | SYSTOLIC BLOOD PRESSURE: 147 MMHG | OXYGEN SATURATION: 98 % | TEMPERATURE: 98.8 F

## 2022-09-23 DIAGNOSIS — G44.52 NEW DAILY PERSISTENT HEADACHE: Primary | ICD-10-CM

## 2022-09-23 DIAGNOSIS — R51.9 ACUTE NONINTRACTABLE HEADACHE, UNSPECIFIED HEADACHE TYPE: ICD-10-CM

## 2022-09-23 DIAGNOSIS — R51.9 ACUTE NONINTRACTABLE HEADACHE, UNSPECIFIED HEADACHE TYPE: Primary | ICD-10-CM

## 2022-09-23 PROCEDURE — 96374 THER/PROPH/DIAG INJ IV PUSH: CPT

## 2022-09-23 PROCEDURE — 96361 HYDRATE IV INFUSION ADD-ON: CPT

## 2022-09-23 PROCEDURE — 96375 TX/PRO/DX INJ NEW DRUG ADDON: CPT

## 2022-09-23 PROCEDURE — 70450 CT HEAD/BRAIN W/O DYE: CPT

## 2022-09-23 PROCEDURE — 99212 OFFICE O/P EST SF 10 MIN: CPT | Performed by: NURSE PRACTITIONER

## 2022-09-23 PROCEDURE — 99284 EMERGENCY DEPT VISIT MOD MDM: CPT

## 2022-09-23 PROCEDURE — 74011250636 HC RX REV CODE- 250/636: Performed by: STUDENT IN AN ORGANIZED HEALTH CARE EDUCATION/TRAINING PROGRAM

## 2022-09-23 RX ORDER — KETOROLAC TROMETHAMINE 30 MG/ML
15 INJECTION, SOLUTION INTRAMUSCULAR; INTRAVENOUS
Status: COMPLETED | OUTPATIENT
Start: 2022-09-23 | End: 2022-09-23

## 2022-09-23 RX ORDER — PROCHLORPERAZINE EDISYLATE 5 MG/ML
10 INJECTION INTRAMUSCULAR; INTRAVENOUS ONCE
Status: COMPLETED | OUTPATIENT
Start: 2022-09-23 | End: 2022-09-23

## 2022-09-23 RX ORDER — PROCHLORPERAZINE MALEATE 10 MG
10 TABLET ORAL
Qty: 12 TABLET | Refills: 0 | Status: SHIPPED | OUTPATIENT
Start: 2022-09-23

## 2022-09-23 RX ADMIN — KETOROLAC TROMETHAMINE 15 MG: 30 INJECTION, SOLUTION INTRAMUSCULAR at 19:50

## 2022-09-23 RX ADMIN — PROCHLORPERAZINE EDISYLATE 10 MG: 5 INJECTION INTRAMUSCULAR; INTRAVENOUS at 19:50

## 2022-09-23 RX ADMIN — SODIUM CHLORIDE 1000 ML: 9 INJECTION, SOLUTION INTRAVENOUS at 19:50

## 2022-09-23 NOTE — PROGRESS NOTES
Here for headache  States was referred to ED by PCP for CT but stopped by urgent care  3 weeks of persistent daily headache 7-8 out of 10. Right side. Worse with stress. No established history of migraines. No hx of frequent headaches  Has tried imitrex, ibuprofen, tylenol and cyclobenzaprine without any relief.          Past Medical History:   Diagnosis Date    Essential hypertension     Hypertension     Mastitis     while breastfeeding    Psychiatric disorder     anxiety     Psychiatric problem     anxiety /ocd        Past Surgical History:   Procedure Laterality Date    HX  SECTION  09    [facility]  DELIVERY ONLY           Family History   Problem Relation Age of Onset    Psychiatric Disorder Mother         anxiety    Hypertension Father     Atrial Fibrillation Father     Hypertension Brother     Hypertension Maternal Grandmother     Cervical Cancer Maternal Grandmother     Cancer Maternal Grandfather         skin, throat    Heart Disease Paternal Grandfather     Hypertension Paternal Grandfather     Hypertension Paternal Grandmother         Social History     Socioeconomic History    Marital status:      Spouse name: Not on file    Number of children: Not on file    Years of education: Not on file    Highest education level: Not on file   Occupational History    Not on file   Tobacco Use    Smoking status: Never    Smokeless tobacco: Never   Vaping Use    Vaping Use: Never used   Substance and Sexual Activity    Alcohol use: Yes     Comment: rare    Drug use: No    Sexual activity: Yes     Partners: Male   Other Topics Concern    Not on file   Social History Narrative    Not on file     Social Determinants of Health     Financial Resource Strain: Not on file   Food Insecurity: Not on file   Transportation Needs: Not on file   Physical Activity: Not on file   Stress: Not on file   Social Connections: Not on file   Intimate Partner Violence: Not on file   Housing Stability: Not on file ALLERGIES: Patient has no known allergies. Review of Systems   Constitutional:  Negative for chills and fever. Respiratory:  Negative for shortness of breath. Gastrointestinal:  Negative for nausea and vomiting. All other systems reviewed and are negative. Vitals:    09/23/22 1728   BP: (!) 147/91   Pulse: 79   Resp: 16   Temp: 98.8 °F (37.1 °C)   SpO2: 98%   Weight: 200 lb (90.7 kg)   Height: 5' 2\" (1.575 m)       Physical Exam  Vitals reviewed. Constitutional:       General: She is not in acute distress. Appearance: She is not ill-appearing. HENT:      Right Ear: Tympanic membrane and ear canal normal.      Left Ear: Tympanic membrane and ear canal normal.   Eyes:      Extraocular Movements: Extraocular movements intact. Pupils: Pupils are equal, round, and reactive to light. Cardiovascular:      Pulses: Normal pulses. Heart sounds: Normal heart sounds. Pulmonary:      Effort: Pulmonary effort is normal.      Breath sounds: Normal breath sounds. Musculoskeletal:      Cervical back: Normal range of motion and neck supple. No rigidity or tenderness. Lymphadenopathy:      Cervical: No cervical adenopathy. Skin:     Capillary Refill: Capillary refill takes less than 2 seconds. Neurological:      General: No focal deficit present. Mental Status: She is oriented to person, place, and time. Cranial Nerves: No cranial nerve deficit. Sensory: No sensory deficit. Motor: No weakness. Coordination: Coordination normal.      Gait: Gait normal.   Psychiatric:         Mood and Affect: Mood normal.         Behavior: Behavior normal.         Thought Content:  Thought content normal.       MDM     Differential Diagnosis; Clinical Impression; Plan:       CLINICAL IMPRESSION:  (R51.9) Acute nonintractable headache, unspecified headache type  (primary encounter diagnosis)      Plan:  Daily persistent headache for 3 weeks 7 to 8/10 without hx of migraines. Promotes was referred to ED by PCP but came to urgent care. No focal neuro deficits on exam.  I have re inforced follow up in ED per PCP recommendation. Consider head CT.                Procedures

## 2022-09-23 NOTE — ED NOTES
Assumed care of pt from waiting room with c/o migraine x3 weeks. Seen a couple of times this week and given medicine, with no relief. Pt referred here by [facility] for head CT.

## 2022-09-24 NOTE — ED PROVIDER NOTES
EMERGENCY DEPARTMENT HISTORY AND PHYSICAL EXAM      Date: 2022  Patient Name: Walt Burks    History of Presenting Illness     Chief Complaint   Patient presents with    Migraine     Ambulatory into triage, cc of right side migraine x3 weeks, pt states she can see an aura in her right eye x 3 weeks as well. Referred here by [facility] for head CT. No hx of migraines         HPI: Walt Burks, 43 y.o. female presents to the ED with cc of headache. This has been going on for the past 3 weeks. She describes a right-sided headache, was not worst at onset. She was started on a triptan yesterday by her doctor but says it has not been helping. She reports associated photophobia and phonophobia. 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 The headache also sometimes radiates down the right shoulder into the right shoulder blade, was given a muscle relaxer which seems to help with that. She denies any new weakness numbness or tingling in the arms or legs, no fevers. She states that intermittently she will have a \"rainbow\" of light over the lateral aspect of just her right vision, denies any visual symptoms currently, no hemianopia or visual field deficits. No travel or exposures, no family history of stroke. There are no other complaints, changes, or physical findings at this time. PCP: Joycelyn Murray MD    No current facility-administered medications on file prior to encounter. Current Outpatient Medications on File Prior to Encounter   Medication Sig Dispense Refill    [] SUMAtriptan (IMITREX) 50 mg tablet Take 1 Tablet by mouth once as needed for Migraine for up to 1 dose. May repeat in 2 hours if no relief 9 Tablet 0    butalbital-acetaminophen-caffeine (FIORICET, ESGIC) -40 mg per tablet Take 1 Tablet by mouth every six (6) hours as needed for Headache.  Indications: tension headache 30 Tablet 1 cyclobenzaprine (FLEXERIL) 10 mg tablet Take 1 Tablet by mouth three (3) times daily as needed for Muscle Spasm(s). 30 Tablet 0    sertraline (ZOLOFT) 100 mg tablet Take 1 Tablet by mouth daily. 90 Tablet 2    losartan (COZAAR) 50 mg tablet Take 1 Tablet by mouth daily. 90 Tablet 1    hydroCHLOROthiazide (HYDRODIURIL) 25 mg tablet TAKE 1 TABLET DAILY 90 Tablet 1    potassium chloride SR (K-TAB) 20 mEq tablet Take 1 Tablet by mouth two (2) times a day. 180 Tablet 1    ALPRAZolam (XANAX) 0.5 mg tablet Take 1 Tab by mouth three (3) times daily as needed for Anxiety.  Max Daily Amount: 1.5 mg. 10 Tab 1       Past History     Past Medical History:  Past Medical History:   Diagnosis Date    Essential hypertension     Hypertension     Mastitis     while breastfeeding    Psychiatric disorder     anxiety     Psychiatric problem     anxiety /ocd       Past Surgical History:  Past Surgical History:   Procedure Laterality Date    HX  SECTION  09    FL  DELIVERY ONLY         Family History:  Family History   Problem Relation Age of Onset    Psychiatric Disorder Mother         anxiety    Hypertension Father     Atrial Fibrillation Father     Hypertension Brother     Hypertension Maternal Grandmother     Cervical Cancer Maternal Grandmother     Cancer Maternal Grandfather         skin, throat    Heart Disease Paternal Grandfather     Hypertension Paternal Grandfather     Hypertension Paternal Grandmother        Social History:  Social History     Tobacco Use    Smoking status: Never    Smokeless tobacco: Never   Vaping Use    Vaping Use: Never used   Substance Use Topics    Alcohol use: Yes     Comment: rare    Drug use: No       Allergies:  No Known Allergies      Review of Systems   no fever  No ear pain  Reports head pain  no shortness of breath  no chest pain  no abdominal pain  no dysuria  no leg pain  No rash  No lymphadenopathy  No weight loss    Physical Exam   Physical Exam  Constitutional: General: She is not in acute distress. Appearance: She is not toxic-appearing. HENT:      Head: Normocephalic. Eyes:      Extraocular Movements: Extraocular movements intact. Pupils: Pupils are equal, round, and reactive to light. Cardiovascular:      Rate and Rhythm: Normal rate and regular rhythm. Pulmonary:      Effort: Pulmonary effort is normal.      Breath sounds: Normal breath sounds. Abdominal:      Palpations: Abdomen is soft. Tenderness: There is no abdominal tenderness. Musculoskeletal:         General: No tenderness or deformity. Cervical back: Neck supple. No rigidity. Skin:     General: Skin is warm and dry. Neurological:      General: No focal deficit present. Mental Status: She is alert and oriented to person, place, and time. Cranial Nerves: No cranial nerve deficit. Comments: 5/5 strength with bicep flexion and extension bilaterally, 5/5 strength with ankle flexion and extension bilaterally. Sensation to light touch intact over upper and lower extremities bilaterally. 5 out of 5 strength with shoulder flexion bilaterally, 5 out of 5 strength with hip flexion bilaterally. Normal finger-to-nose test bilaterally. Psychiatric:         Mood and Affect: Mood normal.       Diagnostic Study Results     Labs -   No results found for this or any previous visit (from the past 24 hour(s)). Radiologic Studies -   CT HEAD WO CONT   Final Result   No acute intracranial abnormality. CT Results  (Last 48 hours)                 09/23/22 1859  CT HEAD WO CONT Final result    Impression:  No acute intracranial abnormality. Narrative:  EXAM: CT HEAD WO CONT       INDICATION: R sided HA       COMPARISON: CT December 2015. CONTRAST: None. TECHNIQUE: Unenhanced CT of the head was performed using 5 mm images. Brain and   bone windows were generated. Coronal and sagittal reformats.  CT dose reduction   was achieved through use of a standardized protocol tailored for this   examination and automatic exposure control for dose modulation. FINDINGS:   The ventricles and sulci are normal in size, shape and configuration. . There is   no significant white matter disease. There is no intracranial hemorrhage,   extra-axial collection, or mass effect. The basilar cisterns are open. No CT   evidence of acute infarct. The bone windows demonstrate no abnormalities. The visualized portions of the   paranasal sinuses and mastoid air cells are clear. CXR Results  (Last 48 hours)      None              Medical Decision Making   I am the first provider for this patient. I reviewed the vital signs, available nursing notes, past medical history, past surgical history, family history and social history. Vital Signs-Reviewed the patient's vital signs. Patient Vitals for the past 24 hrs:   Temp Pulse Resp BP SpO2   09/23/22 1949 -- -- -- (!) 174/115 --   09/23/22 1818 98.7 °F (37.1 °C) 78 16 (!) 174/108 100 %         Provider Notes (Medical Decision Making):   44-year-old female presenting with headache. Differential includes tension headache, migraine headache, sinus headache, less likely cluster headache. Given duration of headache, will obtain head CT to assess for any space-occupying lesion. Headache was not thunderclap in onset, not worst at onset, she is comfortable and well-appearing with normal neuro exam, unlikely SAH. She is afebrile and nontoxic-appearing, neck is supple, unlikely CNS infection. She is well-hydrated appearing, no vomiting or diarrhea, unlikely acute emergent electrolyte or metabolic abnormalities. She is hypertensive, however has known history of hypertension. She is given IV fluids and headache medications. ED Course:     Initial assessment performed. The patients presenting problems have been discussed, and they are in agreement with the care plan formulated and outlined with them.   I have encouraged them to ask questions as they arise throughout their visit. On reevaluation, patient is resting comfortably and states that they feel improved. She says that she wants to be discharged. She does not want to finish the fluid bolus, wants to be discharged now. Patient is counseled on supportive care and return precautions. Will return to the ED for any worsening pain, weakness or numbness, fevers, or any new or worrisome symptoms. Will followup with primary care doctor, neurology within 3 days. Critical Care Time:         Disposition:  Home    PLAN:  1. Current Discharge Medication List        START taking these medications    Details   prochlorperazine (Compazine) 10 mg tablet Take 1 Tablet by mouth every eight (8) hours as needed for PRN Reason (Other) (headache). Qty: 12 Tablet, Refills: 0  Start date: 9/23/2022           2.    Follow-up Information       Follow up With Specialties Details Why Contact Info    Mary Gonzalez MD Internal Medicine Physician Schedule an appointment as soon as possible for a visit in 3 days  [address] [place] [address] Avenue  [phone]      [facility]ospit[facility] EMERGENCY DEPT Emergency Medicine  As needed, If symptoms worsen [address]  [address] [facility]  [phone]          Return to ED if worse     Diagnosis     Clinical Impression: Acute headache

## 2022-10-11 DIAGNOSIS — I10 ESSENTIAL HYPERTENSION: ICD-10-CM

## 2022-10-11 RX ORDER — HYDROCHLOROTHIAZIDE 25 MG/1
TABLET ORAL
Qty: 90 TABLET | Refills: 1 | Status: SHIPPED | OUTPATIENT
Start: 2022-10-11

## 2023-01-30 PROBLEM — N93.9 ABNORMAL VAGINAL BLEEDING: Status: ACTIVE | Noted: 2023-01-30

## 2023-01-30 PROBLEM — N83.202 CYST OF LEFT OVARY: Status: ACTIVE | Noted: 2022-03-16

## 2023-01-30 PROBLEM — R10.2 PELVIC PAIN: Status: ACTIVE | Noted: 2023-01-30

## 2023-03-30 DIAGNOSIS — I10 ESSENTIAL HYPERTENSION: ICD-10-CM

## 2023-03-30 RX ORDER — HYDROCHLOROTHIAZIDE 25 MG/1
TABLET ORAL
Qty: 90 TABLET | Refills: 0 | Status: SHIPPED | OUTPATIENT
Start: 2023-03-30

## 2023-03-30 NOTE — TELEPHONE ENCOUNTER
PCP: Nancy Payne MD     Last appt: 1/31/2023   No future appointments. Requested Prescriptions     Pending Prescriptions Disp Refills    hydroCHLOROthiazide (HYDRODIURIL) 25 mg tablet 90 Tablet 0     Sig: TAKE 1 TABLET BY MOUTH DAILY. Please call the office [phone] to schedule an appointment.

## 2023-07-23 ENCOUNTER — OFFICE VISIT (OUTPATIENT)
Age: 43
End: 2023-07-23

## 2023-07-23 VITALS
RESPIRATION RATE: 18 BRPM | HEART RATE: 83 BPM | WEIGHT: 193 LBS | OXYGEN SATURATION: 98 % | TEMPERATURE: 99.1 F | SYSTOLIC BLOOD PRESSURE: 142 MMHG | BODY MASS INDEX: 35.51 KG/M2 | DIASTOLIC BLOOD PRESSURE: 101 MMHG | HEIGHT: 62 IN

## 2023-07-23 DIAGNOSIS — R11.0 NAUSEA: ICD-10-CM

## 2023-07-23 DIAGNOSIS — F41.9 ANXIETY DISORDER, UNSPECIFIED TYPE: Primary | ICD-10-CM

## 2023-07-23 DIAGNOSIS — I10 ESSENTIAL HYPERTENSION: ICD-10-CM

## 2023-07-23 RX ORDER — ONDANSETRON 4 MG/1
4 TABLET, FILM COATED ORAL 3 TIMES DAILY PRN
Qty: 15 TABLET | Refills: 0 | Status: SHIPPED | OUTPATIENT
Start: 2023-07-23

## 2023-07-23 RX ORDER — BUSPIRONE HYDROCHLORIDE 7.5 MG/1
7.5 TABLET ORAL 2 TIMES DAILY
Qty: 30 TABLET | Refills: 0 | Status: SHIPPED | OUTPATIENT
Start: 2023-07-23 | End: 2023-08-22

## 2023-07-25 ENCOUNTER — TELEPHONE (OUTPATIENT)
Facility: CLINIC | Age: 43
End: 2023-07-25

## 2023-07-25 ENCOUNTER — HOSPITAL ENCOUNTER (EMERGENCY)
Facility: HOSPITAL | Age: 43
Discharge: HOME | End: 2023-07-25
Attending: EMERGENCY MEDICINE
Payer: COMMERCIAL

## 2023-07-25 VITALS
HEART RATE: 71 BPM | WEIGHT: 190.26 LBS | DIASTOLIC BLOOD PRESSURE: 101 MMHG | BODY MASS INDEX: 34.8 KG/M2 | SYSTOLIC BLOOD PRESSURE: 159 MMHG | OXYGEN SATURATION: 96 % | RESPIRATION RATE: 15 BRPM

## 2023-07-25 DIAGNOSIS — F41.0 ANXIETY ATTACK: Primary | ICD-10-CM

## 2023-07-25 PROCEDURE — 6370000000 HC RX 637 (ALT 250 FOR IP): Performed by: EMERGENCY MEDICINE

## 2023-07-25 PROCEDURE — 99283 EMERGENCY DEPT VISIT LOW MDM: CPT

## 2023-07-25 RX ORDER — ALPRAZOLAM 0.5 MG/1
0.5 TABLET ORAL 2 TIMES DAILY PRN
Qty: 12 TABLET | Refills: 0 | Status: SHIPPED | OUTPATIENT
Start: 2023-07-25 | End: 2023-08-24

## 2023-07-25 RX ORDER — ALPRAZOLAM 0.5 MG/1
0.5 TABLET ORAL NIGHTLY PRN
Status: DISCONTINUED | OUTPATIENT
Start: 2023-07-25 | End: 2023-07-25 | Stop reason: HOSPADM

## 2023-07-25 RX ORDER — ALPRAZOLAM 0.5 MG/1
0.5 TABLET ORAL NIGHTLY PRN
Status: DISCONTINUED | OUTPATIENT
Start: 2023-07-25 | End: 2023-07-25

## 2023-07-25 RX ADMIN — ALPRAZOLAM 0.5 MG: 0.5 TABLET ORAL at 11:07

## 2023-07-25 NOTE — ED TRIAGE NOTES
Patient arrives ambulatory to the ED accompanied by her boyfriend. C/O anxiety/panic attack since Friday. States she went to an urgent care, who gave her buspar. She states that her PCP normally gives her alprazolam but is unable to get her in today.

## 2023-07-25 NOTE — ED NOTES
Pt discharged in stable condition at this time. MD/DORENE reviewed discharge instructions, prescriptions, and follow up with patient at bedside. Pt verbalized understanding and denies any needs or questions at this time. Pt NAD on DC ambulatory with her sig other to drive home.         Alfonzo Barton RN  07/25/23 1685

## 2023-07-25 NOTE — TELEPHONE ENCOUNTER
Pt has requested 2 other RX's with 2 different pharmacy's.  Message left to call back to see which pharmacy, [facility] or [facility]

## 2023-07-27 ENCOUNTER — OFFICE VISIT (OUTPATIENT)
Facility: CLINIC | Age: 43
End: 2023-07-27
Payer: COMMERCIAL

## 2023-07-27 VITALS
HEART RATE: 92 BPM | SYSTOLIC BLOOD PRESSURE: 138 MMHG | HEIGHT: 62 IN | RESPIRATION RATE: 12 BRPM | BODY MASS INDEX: 34.23 KG/M2 | DIASTOLIC BLOOD PRESSURE: 80 MMHG | OXYGEN SATURATION: 98 % | TEMPERATURE: 98.8 F | WEIGHT: 186 LBS

## 2023-07-27 DIAGNOSIS — I10 PRIMARY HYPERTENSION: Primary | ICD-10-CM

## 2023-07-27 DIAGNOSIS — F41.9 ANXIETY: ICD-10-CM

## 2023-07-27 DIAGNOSIS — I10 PRIMARY HYPERTENSION: ICD-10-CM

## 2023-07-27 PROCEDURE — 3079F DIAST BP 80-89 MM HG: CPT | Performed by: INTERNAL MEDICINE

## 2023-07-27 PROCEDURE — 3075F SYST BP GE 130 - 139MM HG: CPT | Performed by: INTERNAL MEDICINE

## 2023-07-27 PROCEDURE — 99215 OFFICE O/P EST HI 40 MIN: CPT | Performed by: INTERNAL MEDICINE

## 2023-07-27 RX ORDER — LOSARTAN POTASSIUM 50 MG/1
50 TABLET ORAL DAILY
Qty: 90 TABLET | Refills: 1 | Status: SHIPPED | OUTPATIENT
Start: 2023-07-27

## 2023-07-27 RX ORDER — CLONAZEPAM 0.5 MG/1
0.5 TABLET ORAL 2 TIMES DAILY PRN
Qty: 20 TABLET | Refills: 0 | Status: SHIPPED | OUTPATIENT
Start: 2023-07-27 | End: 2023-08-06

## 2023-07-27 RX ORDER — HYDROCHLOROTHIAZIDE 25 MG/1
25 TABLET ORAL DAILY
Qty: 90 TABLET | Refills: 1 | Status: SHIPPED | OUTPATIENT
Start: 2023-07-27

## 2023-07-27 SDOH — ECONOMIC STABILITY: INCOME INSECURITY: HOW HARD IS IT FOR YOU TO PAY FOR THE VERY BASICS LIKE FOOD, HOUSING, MEDICAL CARE, AND HEATING?: NOT VERY HARD

## 2023-07-27 SDOH — ECONOMIC STABILITY: FOOD INSECURITY: WITHIN THE PAST 12 MONTHS, THE FOOD YOU BOUGHT JUST DIDN'T LAST AND YOU DIDN'T HAVE MONEY TO GET MORE.: NEVER TRUE

## 2023-07-27 SDOH — ECONOMIC STABILITY: FOOD INSECURITY: WITHIN THE PAST 12 MONTHS, YOU WORRIED THAT YOUR FOOD WOULD RUN OUT BEFORE YOU GOT MONEY TO BUY MORE.: NEVER TRUE

## 2023-07-27 SDOH — ECONOMIC STABILITY: HOUSING INSECURITY
IN THE LAST 12 MONTHS, WAS THERE A TIME WHEN YOU DID NOT HAVE A STEADY PLACE TO SLEEP OR SLEPT IN A SHELTER (INCLUDING NOW)?: NO

## 2023-07-27 ASSESSMENT — PATIENT HEALTH QUESTIONNAIRE - PHQ9
4. FEELING TIRED OR HAVING LITTLE ENERGY: 3
SUM OF ALL RESPONSES TO PHQ QUESTIONS 1-9: 23
9. THOUGHTS THAT YOU WOULD BE BETTER OFF DEAD, OR OF HURTING YOURSELF: 0
SUM OF ALL RESPONSES TO PHQ QUESTIONS 1-9: 23
3. TROUBLE FALLING OR STAYING ASLEEP: 3
SUM OF ALL RESPONSES TO PHQ9 QUESTIONS 1 & 2: 5
7. TROUBLE CONCENTRATING ON THINGS, SUCH AS READING THE NEWSPAPER OR WATCHING TELEVISION: 3
5. POOR APPETITE OR OVEREATING: 3
6. FEELING BAD ABOUT YOURSELF - OR THAT YOU ARE A FAILURE OR HAVE LET YOURSELF OR YOUR FAMILY DOWN: 3
8. MOVING OR SPEAKING SO SLOWLY THAT OTHER PEOPLE COULD HAVE NOTICED. OR THE OPPOSITE, BEING SO FIGETY OR RESTLESS THAT YOU HAVE BEEN MOVING AROUND A LOT MORE THAN USUAL: 3
2. FEELING DOWN, DEPRESSED OR HOPELESS: 3
SUM OF ALL RESPONSES TO PHQ QUESTIONS 1-9: 23
10. IF YOU CHECKED OFF ANY PROBLEMS, HOW DIFFICULT HAVE THESE PROBLEMS MADE IT FOR YOU TO DO YOUR WORK, TAKE CARE OF THINGS AT HOME, OR GET ALONG WITH OTHER PEOPLE: 2
SUM OF ALL RESPONSES TO PHQ QUESTIONS 1-9: 23
1. LITTLE INTEREST OR PLEASURE IN DOING THINGS: 2

## 2023-07-27 NOTE — PROGRESS NOTES
HPI  Ms. Moris Roth is a 37y.o. year old female, she is seen today for follow up depression and anxiety. Seeing a counselor. Went to the ER few days ago with panic attack prescribed alprazolam which she had been out of. Last seen by me in 9/2022 for anxiety, depression and in increased zoloft at that time. Has been having a panic attack since Friday. Normally will take 1/2 alprazolam    Has lost 8# in 6 days. No appetite. Took 4 doses of buspirone - prescribed by urgent care     Talked to her counselor on Tuesday - first session. Is through [employer]. Next meeting next Tuesday. 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 Has been dating 2 years - chronic illness with multiple emergencies this summer. Panic attack after meeting with boyfriend's ex-wife. Both children now in regular school - not home schooled now. Feels sweaty all over, nauseous, impending doom. Not sleeping well. One whole alprazolam helped more than 1/2. Feels better for 5-6 hours. Was taking sertraline 50mg instead of 100mg until last week. Feels better talking to her friends. No SI.     Just bought a house, played on tennis team.     Supportive boyfriend. Chief Complaint   Patient presents with    Follow-Up from Hospital     ED        Prior to Admission medications    Medication Sig Start Date End Date Taking? Authorizing Provider   hydroCHLOROthiazide (HYDRODIURIL) 25 MG tablet Take 1 tablet by mouth daily 7/27/23  Yes Ricky Martell MD   losartan (COZAAR) 50 MG tablet Take 1 tablet by mouth daily 7/27/23  Yes Ricky Martell MD   clonazePAM (KLONOPIN) 0.5 MG tablet Take 1 tablet by mouth 2 times daily as needed for Anxiety for up to 10 days. Max Daily Amount: 1 mg 7/27/23 8/6/23 Yes Ricky Martell MD   ALPRAZolam John Magdalena) 0.5 MG tablet Take 1 tablet by mouth 2 times daily as needed for Sleep for up to 30 days.  Max Daily Amount: 1 mg 7/25/23 8/24/23 Yes Shahzad Up MD

## 2023-07-31 LAB
ALBUMIN SERPL-MCNC: 3.8 G/DL (ref 3.5–5)
ALBUMIN/GLOB SERPL: 1.2 (ref 1.1–2.2)
ALP SERPL-CCNC: 62 U/L (ref 45–117)
ALT SERPL-CCNC: 24 U/L (ref 12–78)
ANION GAP SERPL CALC-SCNC: 3 MMOL/L (ref 5–15)
AST SERPL-CCNC: 18 U/L (ref 15–37)
BASOPHILS # BLD: 0.1 K/UL (ref 0–0.1)
BASOPHILS NFR BLD: 2 % (ref 0–1)
BILIRUB SERPL-MCNC: 0.5 MG/DL (ref 0.2–1)
BUN SERPL-MCNC: 10 MG/DL (ref 6–20)
BUN/CREAT SERPL: 12 (ref 12–20)
CALCIUM SERPL-MCNC: 9.3 MG/DL (ref 8.5–10.1)
CHLORIDE SERPL-SCNC: 105 MMOL/L (ref 97–108)
CHOLEST SERPL-MCNC: 145 MG/DL
CO2 SERPL-SCNC: 30 MMOL/L (ref 21–32)
CREAT SERPL-MCNC: 0.82 MG/DL (ref 0.55–1.02)
DIFFERENTIAL METHOD BLD: ABNORMAL
EOSINOPHIL # BLD: 0.1 K/UL (ref 0–0.4)
EOSINOPHIL NFR BLD: 2 % (ref 0–7)
ERYTHROCYTE [DISTWIDTH] IN BLOOD BY AUTOMATED COUNT: 12.7 % (ref 11.5–14.5)
EST. AVERAGE GLUCOSE BLD GHB EST-MCNC: 91 MG/DL
GLOBULIN SER CALC-MCNC: 3.1 G/DL (ref 2–4)
GLUCOSE SERPL-MCNC: 96 MG/DL (ref 65–100)
HBA1C MFR BLD: 4.8 % (ref 4–5.6)
HCT VFR BLD AUTO: 42.9 % (ref 35–47)
HDLC SERPL-MCNC: 44 MG/DL
HDLC SERPL: 3.3 (ref 0–5)
HGB BLD-MCNC: 14.5 G/DL (ref 11.5–16)
IMM GRANULOCYTES # BLD AUTO: 0 K/UL (ref 0–0.04)
IMM GRANULOCYTES NFR BLD AUTO: 0 % (ref 0–0.5)
LDLC SERPL CALC-MCNC: 84.2 MG/DL (ref 0–100)
LYMPHOCYTES # BLD: 1.5 K/UL (ref 0.8–3.5)
LYMPHOCYTES NFR BLD: 32 % (ref 12–49)
MCH RBC QN AUTO: 30.8 PG (ref 26–34)
MCHC RBC AUTO-ENTMCNC: 33.8 G/DL (ref 30–36.5)
MCV RBC AUTO: 91.1 FL (ref 80–99)
MONOCYTES # BLD: 0.4 K/UL (ref 0–1)
MONOCYTES NFR BLD: 8 % (ref 5–13)
NEUTS SEG # BLD: 2.6 K/UL (ref 1.8–8)
NEUTS SEG NFR BLD: 56 % (ref 32–75)
NRBC # BLD: 0 K/UL (ref 0–0.01)
NRBC BLD-RTO: 0 PER 100 WBC
PLATELET # BLD AUTO: 265 K/UL (ref 150–400)
PMV BLD AUTO: 12.1 FL (ref 8.9–12.9)
POTASSIUM SERPL-SCNC: 3.4 MMOL/L (ref 3.5–5.1)
PROT SERPL-MCNC: 6.9 G/DL (ref 6.4–8.2)
RBC # BLD AUTO: 4.71 M/UL (ref 3.8–5.2)
SODIUM SERPL-SCNC: 138 MMOL/L (ref 136–145)
TRIGL SERPL-MCNC: 84 MG/DL
VLDLC SERPL CALC-MCNC: 16.8 MG/DL
WBC # BLD AUTO: 4.6 K/UL (ref 3.6–11)

## 2023-08-01 LAB — TSH SERPL DL<=0.05 MIU/L-ACNC: 0.76 UIU/ML (ref 0.45–4.5)

## 2023-08-02 ENCOUNTER — OFFICE VISIT (OUTPATIENT)
Facility: CLINIC | Age: 43
End: 2023-08-02
Payer: COMMERCIAL

## 2023-08-02 VITALS
HEIGHT: 62 IN | TEMPERATURE: 98 F | BODY MASS INDEX: 35.24 KG/M2 | WEIGHT: 191.5 LBS | OXYGEN SATURATION: 97 % | HEART RATE: 65 BPM | DIASTOLIC BLOOD PRESSURE: 70 MMHG | RESPIRATION RATE: 12 BRPM | SYSTOLIC BLOOD PRESSURE: 124 MMHG

## 2023-08-02 DIAGNOSIS — I10 PRIMARY HYPERTENSION: Primary | ICD-10-CM

## 2023-08-02 DIAGNOSIS — F41.9 ANXIETY: ICD-10-CM

## 2023-08-02 PROCEDURE — 3074F SYST BP LT 130 MM HG: CPT | Performed by: INTERNAL MEDICINE

## 2023-08-02 PROCEDURE — 99214 OFFICE O/P EST MOD 30 MIN: CPT | Performed by: INTERNAL MEDICINE

## 2023-08-02 PROCEDURE — 3078F DIAST BP <80 MM HG: CPT | Performed by: INTERNAL MEDICINE

## 2023-08-02 RX ORDER — POTASSIUM CHLORIDE 20 MEQ/1
20 TABLET, EXTENDED RELEASE ORAL 2 TIMES DAILY
COMMUNITY

## 2023-08-02 NOTE — PROGRESS NOTES
HPI  Ms. Karan Mchugh is a 37y.o. year old female, she is seen today for follow up anxiety, HTN. Has been taking kcl 20meq daily for the past 5 days. Prior was taking sporadically. Recent potassium was low. Lost 9# - appetite is better - eating healthier and getting exercise    Panic attack finally stopped on 7/30 after taking clonazepam 0.5mg bid - hasn't taken it since 7/31. Looking forward to going back to school - set up her classroom recently and enjoyed it. Talked to counselor yesterday for the 2nd time. Mood is much better. Feeling more positive. Has been taking losartan and hctz consistently for the past few weeks. Went to tennis clinic last night at [facility]. In the past would likely have not gone. Says she feels normal like she did 5 years ago. Didn't hear from [facility] -regarding new patient appointment for anxiety. We will reach out again. 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 Authorizing Provider   potassium chloride (KLOR-CON M) 20 MEQ extended release tablet Take 1 tablet by mouth 2 times daily   Yes Historical Provider, MD   hydroCHLOROthiazide (HYDRODIURIL) 25 MG tablet Take 1 tablet by mouth daily 7/27/23  Yes Jeanine Kerr MD   losartan (COZAAR) 50 MG tablet Take 1 tablet by mouth daily 7/27/23  Yes Jeanine Kerr MD   clonazePAM (KLONOPIN) 0.5 MG tablet Take 1 tablet by mouth 2 times daily as needed for Anxiety for up to 10 days. Max Daily Amount: 1 mg 7/27/23 8/6/23 Yes Jeanine Kerr MD   ALPRAZolam Tony Beverage) 0.5 MG tablet Take 1 tablet by mouth 2 times daily as needed for Sleep for up to 30 days.  Max Daily Amount: 1 mg 7/25/23 8/24/23 Yes Yandy Saul MD   ondansetron (ZOFRAN) 4 MG tablet Take 1 tablet by mouth 3 times daily as

## 2023-09-20 ENCOUNTER — OFFICE VISIT (OUTPATIENT)
Age: 43
End: 2023-09-20

## 2023-09-20 VITALS
DIASTOLIC BLOOD PRESSURE: 89 MMHG | TEMPERATURE: 98.4 F | HEART RATE: 69 BPM | WEIGHT: 188.6 LBS | OXYGEN SATURATION: 97 % | RESPIRATION RATE: 18 BRPM | SYSTOLIC BLOOD PRESSURE: 144 MMHG | BODY MASS INDEX: 34.5 KG/M2

## 2023-09-20 DIAGNOSIS — S63.502A SPRAIN AND STRAIN OF LEFT WRIST: Primary | ICD-10-CM

## 2023-09-20 DIAGNOSIS — S66.912A SPRAIN AND STRAIN OF LEFT WRIST: Primary | ICD-10-CM

## 2023-09-20 DIAGNOSIS — M79.642 LEFT HAND PAIN: ICD-10-CM

## 2023-09-20 DIAGNOSIS — M25.532 LEFT WRIST PAIN: ICD-10-CM

## 2023-09-20 PROBLEM — F33.2 SEVERE EPISODE OF RECURRENT MAJOR DEPRESSIVE DISORDER, WITHOUT PSYCHOTIC FEATURES (HCC): Status: ACTIVE | Noted: 2023-08-08

## 2023-09-20 LAB
HCG, PREGNANCY, URINE, POC: NEGATIVE
VALID INTERNAL CONTROL, POC: YES

## 2023-09-20 NOTE — PATIENT INSTRUCTIONS
XR of your L wrist and hand are pending at this time awaiting a final read from the radiologist.   At this time I suspect a sprain of your L wrist  Please ice for 20 minute every 2 hours for the next 72 hours to reduce inflammation. Rest the wrist and elevate it on pillows to reduce swelling. You may take ibuprofen 400-600 mg every 6 hours as needed for pain control. We will follow-up with your regarding your XR results tomorrow. If an abnormality is noted we will refer you to ortho .

## 2024-02-06 RX ORDER — SERTRALINE HYDROCHLORIDE 100 MG/1
TABLET, FILM COATED ORAL
Qty: 135 TABLET | Refills: 3 | Status: SHIPPED | OUTPATIENT
Start: 2024-02-06

## 2024-03-08 DIAGNOSIS — I10 PRIMARY HYPERTENSION: ICD-10-CM

## 2024-03-08 RX ORDER — HYDROCHLOROTHIAZIDE 25 MG/1
25 TABLET ORAL DAILY
Qty: 90 TABLET | Refills: 1 | Status: SHIPPED | OUTPATIENT
Start: 2024-03-08

## 2024-03-08 NOTE — TELEPHONE ENCOUNTER
[facility] requested refill renewal for    Name from pharmacy: HYDROCHLOROTHIAZIDE 25 MG TAB          Will file in chart as: hydroCHLOROthiazide (HYDRODIURIL) 25 MG tablet    Sig: TAKE 1 TABLET BY MOUTH EVERY DAY    Disp: 90 tablet    Refills: 1    Start: 3/8/2024

## 2024-04-17 DIAGNOSIS — I10 PRIMARY HYPERTENSION: ICD-10-CM

## 2024-04-17 RX ORDER — LOSARTAN POTASSIUM 50 MG/1
50 TABLET ORAL DAILY
Qty: 90 TABLET | Refills: 0 | Status: SHIPPED | OUTPATIENT
Start: 2024-04-17

## 2024-04-17 NOTE — TELEPHONE ENCOUNTER
PCP: Jackie Hudson MD     Last appt: 12/20/2023  No future appointments.       Requested Prescriptions     Pending Prescriptions Disp Refills    losartan (COZAAR) 50 MG tablet [Pharmacy Med Name: LOSARTAN POTASSIUM 50 MG TAB] 90 tablet 1     Sig: TAKE 1 TABLET BY MOUTH EVERY DAY

## 2024-06-15 DIAGNOSIS — I10 PRIMARY HYPERTENSION: ICD-10-CM

## 2024-06-16 RX ORDER — LOSARTAN POTASSIUM 50 MG/1
50 TABLET ORAL DAILY
Qty: 90 TABLET | Refills: 0 | Status: SHIPPED | OUTPATIENT
Start: 2024-06-16

## 2024-06-27 DIAGNOSIS — I10 PRIMARY HYPERTENSION: ICD-10-CM

## 2024-06-27 RX ORDER — HYDROCHLOROTHIAZIDE 25 MG/1
25 TABLET ORAL DAILY
Qty: 90 TABLET | Refills: 0 | Status: SHIPPED | OUTPATIENT
Start: 2024-06-27

## 2024-07-02 ENCOUNTER — OFFICE VISIT (OUTPATIENT)
Facility: CLINIC | Age: 44
End: 2024-07-02
Payer: COMMERCIAL

## 2024-07-02 VITALS
TEMPERATURE: 98.1 F | HEART RATE: 66 BPM | RESPIRATION RATE: 12 BRPM | WEIGHT: 188 LBS | SYSTOLIC BLOOD PRESSURE: 126 MMHG | DIASTOLIC BLOOD PRESSURE: 90 MMHG | OXYGEN SATURATION: 98 % | BODY MASS INDEX: 34.6 KG/M2 | HEIGHT: 62 IN

## 2024-07-02 DIAGNOSIS — F41.9 ANXIETY: ICD-10-CM

## 2024-07-02 DIAGNOSIS — I10 BENIGN ESSENTIAL HYPERTENSION: Primary | ICD-10-CM

## 2024-07-02 PROCEDURE — 3074F SYST BP LT 130 MM HG: CPT | Performed by: INTERNAL MEDICINE

## 2024-07-02 PROCEDURE — 3080F DIAST BP >= 90 MM HG: CPT | Performed by: INTERNAL MEDICINE

## 2024-07-02 PROCEDURE — 99214 OFFICE O/P EST MOD 30 MIN: CPT | Performed by: INTERNAL MEDICINE

## 2024-07-02 ASSESSMENT — PATIENT HEALTH QUESTIONNAIRE - PHQ9
2. FEELING DOWN, DEPRESSED OR HOPELESS: SEVERAL DAYS
6. FEELING BAD ABOUT YOURSELF - OR THAT YOU ARE A FAILURE OR HAVE LET YOURSELF OR YOUR FAMILY DOWN: SEVERAL DAYS
1. LITTLE INTEREST OR PLEASURE IN DOING THINGS: SEVERAL DAYS
7. TROUBLE CONCENTRATING ON THINGS, SUCH AS READING THE NEWSPAPER OR WATCHING TELEVISION: SEVERAL DAYS
3. TROUBLE FALLING OR STAYING ASLEEP: SEVERAL DAYS
SUM OF ALL RESPONSES TO PHQ QUESTIONS 1-9: 8
SUM OF ALL RESPONSES TO PHQ QUESTIONS 1-9: 7
SUM OF ALL RESPONSES TO PHQ QUESTIONS 1-9: 8
SUM OF ALL RESPONSES TO PHQ9 QUESTIONS 1 & 2: 2
10. IF YOU CHECKED OFF ANY PROBLEMS, HOW DIFFICULT HAVE THESE PROBLEMS MADE IT FOR YOU TO DO YOUR WORK, TAKE CARE OF THINGS AT HOME, OR GET ALONG WITH OTHER PEOPLE: SOMEWHAT DIFFICULT
4. FEELING TIRED OR HAVING LITTLE ENERGY: SEVERAL DAYS
SUM OF ALL RESPONSES TO PHQ QUESTIONS 1-9: 8
9. THOUGHTS THAT YOU WOULD BE BETTER OFF DEAD, OR OF HURTING YOURSELF: SEVERAL DAYS
5. POOR APPETITE OR OVEREATING: SEVERAL DAYS
8. MOVING OR SPEAKING SO SLOWLY THAT OTHER PEOPLE COULD HAVE NOTICED. OR THE OPPOSITE, BEING SO FIGETY OR RESTLESS THAT YOU HAVE BEEN MOVING AROUND A LOT MORE THAN USUAL: NOT AT ALL

## 2024-07-02 NOTE — PROGRESS NOTES
HPI  Ms. Nichole Daniel is a 43 y.o. year old female, she is seen today for follow up HTN, anxiety.   Mood - says she feels \"panicky\" all the time - especially regarding traveling - ie more than 30 minute car rise - feels anxious about potentially having a panic    Therapist for 2 mos - once weekly - has been helping    No SI  Will feel down sometimes    No chest pain, sob, dizziness, weakness, lightheadedness.         Chief Complaint   Patient presents with    Hypertension    Anxiety        Prior to Admission medications    Medication Sig Start Date End Date Taking? Authorizing Provider   DULoxetine HCl 20 MG CSDR 20mg daily for 4 days then 40mg daily 7/2/24  Yes Jackie Hudson MD   hydroCHLOROthiazide (HYDRODIURIL) 25 MG tablet TAKE 1 TABLET BY MOUTH EVERY DAY 6/27/24  Yes Jackie Hudson MD   losartan (COZAAR) 50 MG tablet TAKE 1 TABLET BY MOUTH EVERY DAY 6/16/24  Yes Jackie Hudson MD   sertraline (ZOLOFT) 100 MG tablet TAKE 1 AND 1/2 TABLETS BY MOUTH EVERY MORNING 2/6/24  Yes Jackie Hudson MD   potassium chloride (KLOR-CON M) 20 MEQ extended release tablet Take 1 tablet by mouth daily   Yes Provider, MD Uzma   clonazePAM (KLONOPIN) 0.5 MG tablet Take 1 tablet by mouth 2 times daily as needed for Anxiety for up to 10 days. Max Daily Amount: 1 mg 7/27/23  Yes Jackie Hudson MD   ondansetron (ZOFRAN) 4 MG tablet Take 1 tablet by mouth 3 times daily as needed for Nausea or Vomiting 7/23/23  Yes Noemí Funk, SAROJ - NP         No Known Allergies      REVIEW OF SYSTEMS:  Per HPI    PHYSICAL EXAM:  BP (!) 126/90   Pulse 66   Temp 98.1 °F (36.7 °C) (Oral)   Resp 12   Ht 1.575 m (5' 2\")   Wt 85.3 kg (188 lb)   LMP 06/22/2024   SpO2 98%   BMI 34.39 kg/m²   Constitutional: Appears well-developed and well-nourished. No distress.   HENT:   Head: Normocephalic and atraumatic.   Eyes: No scleral icterus.   Neck: no lad, no tm, supple   Cardiovascular: Normal S1/S2, regular rhythm.  No

## 2024-07-02 NOTE — PATIENT INSTRUCTIONS
Decrease sertraline to 100mg daily for 2 days while taking duloxetine 20mg daily  Then decrease sertraline to 50mg daily for 2 days while continuing 20mg daily duloxetine  Then decrease sertraline to 25mg daily for 2 days while taking duloxetine 40mg daily  After 2 days continue duloxetine 40mg daily and stop sertraline  You may increase duloxetine to 60mg daily if needed

## 2024-07-02 NOTE — PROGRESS NOTES
Nichole Daniel  Identified pt with two pt identifiers(name and ).     Chief Complaint   Patient presents with    Hypertension    Anxiety       Reviewed record In preparation for visit and have obtained necessary documentation.     1. Have you been to the ER, urgent care clinic or hospitalized since your last visit? No     2. Have you seen or consulted any other health care providers outside of the [facility] since your last visit? Include any pap smears or colon screening. No    Vitals reviewed with provider.    Health Maintenance reviewed:     Health Maintenance Due   Topic    Varicella vaccine (1 of 2 - 2-dose childhood series)    Hepatitis C screen     Hepatitis B vaccine (3 of 3 - 19+ 3-dose series)    Breast cancer screen     Cervical cancer screen     COVID-19 Vaccine ( season)    Depression Monitoring           Wt Readings from Last 3 Encounters:   23 86.9 kg (191 lb 8 oz)   23 85.5 kg (188 lb 9.6 oz)   23 86.9 kg (191 lb 8 oz)        Temp Readings from Last 3 Encounters:   23 98.7 °F (37.1 °C) (Oral)   23 98.4 °F (36.9 °C) (Temporal)   23 98 °F (36.7 °C) (Oral)        BP Readings from Last 3 Encounters:   23 130/80   23 (!) 144/89   23 124/70        Pulse Readings from Last 3 Encounters:   23 65   23 69   23 65      [unfilled]       Learning Assessment:   :         2023     8:04 AM   [facility] AMB LEARNING ASSESSMENT   Primary Learner Patient   level of education 4 YEARS OF COLLEGE   Barriers Factors NONE   co-learner caregiver No   Primary Language ENGLISH   Learning Preference DEMONSTRATION   Answered By patient   Relationship to Learner SELF         Fall Risk Assessment:   :          No data to display                   Abuse Screening:   :          No data to display                   ADL Screening:   :         2023    10:00 AM   ADL ASSESSMENT   Feeding yourself No Help Needed   Getting from bed to

## 2024-07-03 LAB
ALBUMIN SERPL-MCNC: 3.8 G/DL (ref 3.5–5)
ALBUMIN/GLOB SERPL: 1.1 (ref 1.1–2.2)
ALP SERPL-CCNC: 76 U/L (ref 45–117)
ALT SERPL-CCNC: 21 U/L (ref 12–78)
ANION GAP SERPL CALC-SCNC: 5 MMOL/L (ref 5–15)
AST SERPL-CCNC: 16 U/L (ref 15–37)
BILIRUB SERPL-MCNC: 0.5 MG/DL (ref 0.2–1)
BUN SERPL-MCNC: 11 MG/DL (ref 6–20)
BUN/CREAT SERPL: 14 (ref 12–20)
CALCIUM SERPL-MCNC: 9.1 MG/DL (ref 8.5–10.1)
CHLORIDE SERPL-SCNC: 105 MMOL/L (ref 97–108)
CO2 SERPL-SCNC: 29 MMOL/L (ref 21–32)
CREAT SERPL-MCNC: 0.77 MG/DL (ref 0.55–1.02)
GLOBULIN SER CALC-MCNC: 3.4 G/DL (ref 2–4)
GLUCOSE SERPL-MCNC: 87 MG/DL (ref 65–100)
POTASSIUM SERPL-SCNC: 3.8 MMOL/L (ref 3.5–5.1)
PROT SERPL-MCNC: 7.2 G/DL (ref 6.4–8.2)
SODIUM SERPL-SCNC: 139 MMOL/L (ref 136–145)

## 2024-07-24 DIAGNOSIS — F41.9 ANXIETY: ICD-10-CM

## 2024-07-24 RX ORDER — DULOXETIN HYDROCHLORIDE 20 MG/1
CAPSULE, DELAYED RELEASE ORAL
Qty: 180 CAPSULE | Refills: 0 | Status: SHIPPED | OUTPATIENT
Start: 2024-07-24

## 2024-08-20 DIAGNOSIS — I10 PRIMARY HYPERTENSION: ICD-10-CM

## 2024-08-20 RX ORDER — HYDROCHLOROTHIAZIDE 25 MG/1
25 TABLET ORAL DAILY
Qty: 90 TABLET | Refills: 0 | Status: SHIPPED | OUTPATIENT
Start: 2024-08-20

## 2024-08-20 NOTE — TELEPHONE ENCOUNTER
PCP: Jackie Hudson MD     Last appt: 7/2/2024  No future appointments.       Requested Prescriptions     Pending Prescriptions Disp Refills    hydroCHLOROthiazide (HYDRODIURIL) 25 MG tablet [Pharmacy Med Name: HYDROCHLOROTHIAZIDE 25 MG TAB] 90 tablet 0     Sig: TAKE 1 TABLET BY MOUTH EVERY DAY

## 2024-08-30 ENCOUNTER — TELEPHONE (OUTPATIENT)
Facility: CLINIC | Age: 44
End: 2024-08-30

## 2024-08-30 NOTE — TELEPHONE ENCOUNTER
----- Message from Myrtle PATEL sent at 8/30/2024  8:23 AM EDT -----  Regarding: [facility] Appointment Request  [facility] Appointment Request    Patient needs appointment for [facility] Appointment Type: Existing Condition Follow Up.    Patient Requested Dates(s):October 18, 2024  Patient Requested Time:morning  Provider Name:Jackie Becker    Reason for Appointment Request: Established Patient - No appointments available during search. Medication check up.    --------------------------------------------------------------------------------------------------------------------------    Relationship to Patient: Self     Call Back Information: OK to leave message on voicemail  Preferred Call Back Number: Phone  [phone]

## 2024-08-30 NOTE — TELEPHONE ENCOUNTER
Patient called back stating her blood pressure has been high for a month. Can someone give her a call [phone]

## 2024-09-04 ENCOUNTER — TELEMEDICINE (OUTPATIENT)
Facility: CLINIC | Age: 44
End: 2024-09-04
Payer: COMMERCIAL

## 2024-09-04 DIAGNOSIS — F41.9 ANXIETY: ICD-10-CM

## 2024-09-04 DIAGNOSIS — I10 PRIMARY HYPERTENSION: Primary | ICD-10-CM

## 2024-09-04 PROCEDURE — 99213 OFFICE O/P EST LOW 20 MIN: CPT | Performed by: INTERNAL MEDICINE

## 2024-09-04 RX ORDER — OLMESARTAN MEDOXOMIL 40 MG/1
40 TABLET ORAL DAILY
Qty: 30 TABLET | Refills: 5 | Status: SHIPPED | OUTPATIENT
Start: 2024-09-04

## 2024-09-04 SDOH — ECONOMIC STABILITY: FOOD INSECURITY: WITHIN THE PAST 12 MONTHS, YOU WORRIED THAT YOUR FOOD WOULD RUN OUT BEFORE YOU GOT MONEY TO BUY MORE.: NEVER TRUE

## 2024-09-04 SDOH — ECONOMIC STABILITY: FOOD INSECURITY: WITHIN THE PAST 12 MONTHS, THE FOOD YOU BOUGHT JUST DIDN'T LAST AND YOU DIDN'T HAVE MONEY TO GET MORE.: NEVER TRUE

## 2024-09-04 SDOH — ECONOMIC STABILITY: INCOME INSECURITY: HOW HARD IS IT FOR YOU TO PAY FOR THE VERY BASICS LIKE FOOD, HOUSING, MEDICAL CARE, AND HEATING?: NOT HARD AT ALL

## 2024-09-04 NOTE — PROGRESS NOTES
Nichole Daniel  Identified pt with two pt identifiers(name and ).     Chief Complaint   Patient presents with    Hypertension       Reviewed record In preparation for visit and have obtained necessary documentation.     1. Have you been to the ER, urgent care clinic or hospitalized since your last visit? No     2. Have you seen or consulted any other health care providers outside of the [facility] since your last visit? Include any pap smears or colon screening. No    Vitals reviewed with provider.    Health Maintenance reviewed:     Health Maintenance Due   Topic    Hepatitis C screen     Hepatitis B vaccine (3 of 3 - 19+ 3-dose series)    Cervical cancer screen     Flu vaccine (1)    COVID-19 Vaccine ( -  season)          Wt Readings from Last 3 Encounters:   24 85.3 kg (188 lb)   23 86.9 kg (191 lb 8 oz)   23 85.5 kg (188 lb 9.6 oz)        Temp Readings from Last 3 Encounters:   24 98.1 °F (36.7 °C) (Oral)   23 98.7 °F (37.1 °C) (Oral)   23 98.4 °F (36.9 °C) (Temporal)        BP Readings from Last 3 Encounters:   24 (!) 126/90   23 130/80   23 (!) 144/89        Pulse Readings from Last 3 Encounters:   24 66   23 65   23 69      [unfilled]       Learning Assessment:   :         2023     8:04 AM   [facility] AMB LEARNING ASSESSMENT   Primary Learner Patient   level of education 4 YEARS OF COLLEGE   Barriers Factors NONE   co-learner caregiver No   Primary Language ENGLISH   Learning Preference DEMONSTRATION   Answered By patient   Relationship to Learner SELF         Fall Risk Assessment:   :          No data to display                   Abuse Screening:   :          No data to display                   ADL Screening:   :         2023    10:00 AM   ADL ASSESSMENT   Feeding yourself No Help Needed   Getting from bed to chair No Help Needed   Getting dressed No Help Needed   Bathing or showering No Help Needed   Walk

## 2024-09-04 NOTE — PROGRESS NOTES
2024    TELEHEALTH EVALUATION -- Audio/Visual    HPI:    Nichole Daniel (:  1980) has requested an audio/video evaluation for the following concern(s):    BP has been high for about a month - was 132/88 at ob/gyn in July. Has been checking at home 130s-160s/90-100s - today with manual cuff in school nurses office was 150/92.   Has been feeling more tired.   Has been exercising more, sleeping better     Still feels anxious when she has down time - worried about having a panic attack - feels better when mind is busy - ie at school.     Taking duloxetine 40mg daily.       Review of Systems  Per HPI  Prior to Visit Medications    Medication Sig Taking? Authorizing Provider   olmesartan (BENICAR) 40 MG tablet Take 1 tablet by mouth daily Yes Jackie Hudson MD   hydroCHLOROthiazide (HYDRODIURIL) 25 MG tablet TAKE 1 TABLET BY MOUTH EVERY DAY Yes Jackie Hudson MD   DULoxetine (CYMBALTA) 20 MG extended release capsule TAKE 1 CAPSULE BY MOUTH DAILY FOR 4 DAYS THEN INCREASE TO 2 CAPSULES DAILY Yes Jackie Hudson MD   potassium chloride (KLOR-CON M) 20 MEQ extended release tablet Take 1 tablet by mouth daily Yes ProviderUzma MD   clonazePAM (KLONOPIN) 0.5 MG tablet Take 1 tablet by mouth 2 times daily as needed for Anxiety for up to 10 days. Max Daily Amount: 1 mg Yes Jackie Hudson MD   ondansetron (ZOFRAN) 4 MG tablet Take 1 tablet by mouth 3 times daily as needed for Nausea or Vomiting Yes Noemí Funk, APRN - NP   Multiple Vitamin (MULTIVITAMIN ADULT PO)   Provider, MD Uzma       Social History     Tobacco Use    Smoking status: Never     Passive exposure: Never    Smokeless tobacco: Never   Vaping Use    Vaping status: Never Used   Substance Use Topics    Alcohol use: Not Currently     Comment: occ    Drug use: Yes     Frequency: 1.0 times per week     Types: Marijuana (Weed)            PHYSICAL EXAMINATION:  [ INSTRUCTIONS:  \"[x]\" Indicates a positive item  \"[]\"

## 2024-10-04 DIAGNOSIS — I10 PRIMARY HYPERTENSION: ICD-10-CM

## 2024-10-04 RX ORDER — HYDROCHLOROTHIAZIDE 25 MG/1
25 TABLET ORAL DAILY
Qty: 90 TABLET | Refills: 0 | Status: SHIPPED | OUTPATIENT
Start: 2024-10-04

## 2024-10-04 NOTE — TELEPHONE ENCOUNTER
PCP: Jackie Hudson MD     Last appt: 9/4/2024  No future appointments.       Requested Prescriptions     Pending Prescriptions Disp Refills    hydroCHLOROthiazide (HYDRODIURIL) 25 MG tablet [Pharmacy Med Name: HYDROCHLOROTHIAZIDE 25 MG TAB] 90 tablet 1     Sig: TAKE 1 TABLET BY MOUTH EVERY DAY

## 2024-11-04 ENCOUNTER — TELEPHONE (OUTPATIENT)
Facility: CLINIC | Age: 44
End: 2024-11-04

## 2024-11-04 DIAGNOSIS — F41.9 ANXIETY: ICD-10-CM

## 2024-11-04 RX ORDER — DULOXETIN HYDROCHLORIDE 20 MG/1
CAPSULE, DELAYED RELEASE ORAL
Qty: 60 CAPSULE | Refills: 0 | Status: SHIPPED | OUTPATIENT
Start: 2024-11-04

## 2024-11-04 NOTE — TELEPHONE ENCOUNTER
PCP: Jackie Hudson MD     Last appt: 9/4/2024  No future appointments.       Requested Prescriptions     Pending Prescriptions Disp Refills    DULoxetine (CYMBALTA) 20 MG extended release capsule [Pharmacy Med Name: DULOXETINE HCL DR 20 MG CAP] 60 capsule 0     Sig: TAKE 1 CAPSULE BY MOUTH DAILY FOR 4 DAYS THEN INCREASE TO 2 CAPSULES DAILY

## 2024-11-27 ENCOUNTER — OFFICE VISIT (OUTPATIENT)
Facility: CLINIC | Age: 44
End: 2024-11-27

## 2024-11-27 VITALS
SYSTOLIC BLOOD PRESSURE: 130 MMHG | DIASTOLIC BLOOD PRESSURE: 90 MMHG | TEMPERATURE: 99.2 F | WEIGHT: 194 LBS | OXYGEN SATURATION: 98 % | HEIGHT: 62 IN | RESPIRATION RATE: 12 BRPM | BODY MASS INDEX: 35.7 KG/M2 | HEART RATE: 81 BPM

## 2024-11-27 DIAGNOSIS — F41.9 ANXIETY: ICD-10-CM

## 2024-11-27 DIAGNOSIS — K64.9 HEMORRHOIDS, UNSPECIFIED HEMORRHOID TYPE: ICD-10-CM

## 2024-11-27 DIAGNOSIS — K62.5 RECTAL BLEEDING: ICD-10-CM

## 2024-11-27 DIAGNOSIS — I10 PRIMARY HYPERTENSION: Primary | ICD-10-CM

## 2024-11-27 LAB
HEMOCCULT STL QL: NEGATIVE
VALID INTERNAL CONTROL: YES

## 2024-11-27 RX ORDER — HYDROCORTISONE ACETATE 25 MG/1
25 SUPPOSITORY RECTAL 2 TIMES DAILY PRN
Qty: 20 SUPPOSITORY | Refills: 1 | Status: SHIPPED | OUTPATIENT
Start: 2024-11-27

## 2024-11-27 RX ORDER — DULOXETIN HYDROCHLORIDE 60 MG/1
60 CAPSULE, DELAYED RELEASE ORAL DAILY
Qty: 90 CAPSULE | Refills: 1 | Status: SHIPPED | OUTPATIENT
Start: 2024-11-27

## 2024-11-27 NOTE — PROGRESS NOTES
Nichole Daniel  Identified pt with two pt identifiers(name and ).     Chief Complaint   Patient presents with    Hypertension    Depression    Rectal Bleeding     6 months       Reviewed record In preparation for visit and have obtained necessary documentation.     1. Have you been to the ER, urgent care clinic or hospitalized since your last visit? No     2. Have you seen or consulted any other health care providers outside of the [facility] since your last visit? Include any pap smears or colon screening. No    Vitals reviewed with provider.    Health Maintenance reviewed:     Health Maintenance Due   Topic    Hepatitis C screen     Hepatitis B vaccine (3 of 3 - 19+ 3-dose series)    Cervical cancer screen           Wt Readings from Last 3 Encounters:   24 88 kg (194 lb)   24 85.3 kg (188 lb)   23 86.9 kg (191 lb 8 oz)        Temp Readings from Last 3 Encounters:   24 99.2 °F (37.3 °C) (Oral)   24 98.1 °F (36.7 °C) (Oral)   23 98.7 °F (37.1 °C) (Oral)        BP Readings from Last 3 Encounters:   24 (!) 144/90   24 (!) 126/90   23 130/80        Pulse Readings from Last 3 Encounters:   24 81   24 66   23 65      [unfilled]       Learning Assessment:   :         2023     8:04 AM   [facility] AMB LEARNING ASSESSMENT   Primary Learner Patient   level of education 4 YEARS OF COLLEGE   Barriers Factors NONE   co-learner caregiver No   Primary Language ENGLISH   Learning Preference DEMONSTRATION   Answered By patient   Relationship to Learner SELF         Fall Risk Assessment:   :          No data to display                   Abuse Screening:   :          No data to display                   ADL Screening:   :         2023    10:00 AM   ADL ASSESSMENT   Feeding yourself No Help Needed   Getting from bed to chair No Help Needed   Getting dressed No Help Needed   Bathing or showering No Help Needed   Walk across the room (includes 
suppository; Place 1 suppository rectally 2 times daily as needed for Hemorrhoids    Blood pressure is normally better controlled at home, continue olmesartan and hydrochlorothiazide.  Anxiety not controlled, increase duloxetine to 60 mg daily and continue counseling.  Hemorrhoids on exam, try Anusol and MiraLAX daily to prevent constipation.  Stool was heme-negative.      Health Maintenance Due   Topic Date Due    Hepatitis C screen  Never done    Hepatitis B vaccine (3 of 3 - 19+ 3-dose series) 01/10/2013    Cervical cancer screen  07/03/2021        Follow-up and Dispositions    Return in about 3 months (around 2/27/2025) for HTN, ANXIETY.            Reviewed plan of care. Patient has provided input and agrees with goals.     The nurse provided the patient and/or family with advanced directive information if needed and encouraged the patient to provide a copy to the office when available.

## 2025-01-01 DIAGNOSIS — I10 PRIMARY HYPERTENSION: ICD-10-CM

## 2025-01-02 RX ORDER — HYDROCHLOROTHIAZIDE 25 MG/1
25 TABLET ORAL DAILY
Qty: 90 TABLET | Refills: 1 | Status: SHIPPED | OUTPATIENT
Start: 2025-01-02

## 2025-01-02 NOTE — TELEPHONE ENCOUNTER
Future Appointments:  Future Appointments   Date Time Provider Department Center   3/12/2025  2:10 PM Jackie Hudson MD [facility] DEP        Last Appointment With Me:  11/27/2024     Requested Prescriptions     Pending Prescriptions Disp Refills    hydroCHLOROthiazide (HYDRODIURIL) 25 MG tablet [Pharmacy Med Name: HYDROCHLOROTHIAZIDE 25 MG TAB] 90 tablet 0     Sig: TAKE 1 TABLET BY MOUTH EVERY DAY

## 2025-01-25 DIAGNOSIS — F41.9 ANXIETY: ICD-10-CM

## 2025-01-27 RX ORDER — DULOXETIN HYDROCHLORIDE 60 MG/1
CAPSULE, DELAYED RELEASE ORAL DAILY
Qty: 90 CAPSULE | Refills: 1 | Status: SHIPPED | OUTPATIENT
Start: 2025-01-27

## 2025-01-27 NOTE — TELEPHONE ENCOUNTER
Future Appointments:  Future Appointments   Date Time Provider Department Center   3/12/2025  2:10 PM Jackie Hudson MD [facility] DEP        Last Appointment With Me:  11/27/2024     Requested Prescriptions     Pending Prescriptions Disp Refills    DULoxetine (CYMBALTA) 60 MG extended release capsule [Pharmacy Med Name: DULOXETINE HCL DR 60 MG CAP] 90 capsule 2     Sig: TAKE 1 CAPSULE BY MOUTH EVERY DAY

## 2025-03-02 DIAGNOSIS — I10 PRIMARY HYPERTENSION: ICD-10-CM

## 2025-03-03 RX ORDER — OLMESARTAN MEDOXOMIL 40 MG/1
40 TABLET ORAL DAILY
Qty: 90 TABLET | Refills: 1 | Status: SHIPPED | OUTPATIENT
Start: 2025-03-03

## 2025-03-03 NOTE — TELEPHONE ENCOUNTER
PCP: Jackei Hudson MD     Last appt:  11/27/2024      Future Appointments   Date Time Provider Department Center   3/12/2025  2:10 PM Jackie Hudson MD [facility] DEP          Requested Prescriptions     Pending Prescriptions Disp Refills    olmesartan (BENICAR) 40 MG tablet [Pharmacy Med Name: OLMESARTAN MEDOXOMIL 40 MG TAB] 90 tablet 1     Sig: TAKE 1 TABLET BY MOUTH EVERY DAY

## 2025-03-12 ENCOUNTER — OFFICE VISIT (OUTPATIENT)
Facility: CLINIC | Age: 45
End: 2025-03-12
Payer: COMMERCIAL

## 2025-03-12 VITALS
OXYGEN SATURATION: 97 % | RESPIRATION RATE: 16 BRPM | HEIGHT: 62 IN | DIASTOLIC BLOOD PRESSURE: 80 MMHG | WEIGHT: 196.2 LBS | TEMPERATURE: 98.4 F | BODY MASS INDEX: 36.1 KG/M2 | SYSTOLIC BLOOD PRESSURE: 121 MMHG

## 2025-03-12 DIAGNOSIS — F41.9 ANXIETY: ICD-10-CM

## 2025-03-12 DIAGNOSIS — I10 PRIMARY HYPERTENSION: Primary | ICD-10-CM

## 2025-03-12 PROCEDURE — 99213 OFFICE O/P EST LOW 20 MIN: CPT | Performed by: INTERNAL MEDICINE

## 2025-03-12 PROCEDURE — 3079F DIAST BP 80-89 MM HG: CPT | Performed by: INTERNAL MEDICINE

## 2025-03-12 PROCEDURE — 3074F SYST BP LT 130 MM HG: CPT | Performed by: INTERNAL MEDICINE

## 2025-03-12 SDOH — ECONOMIC STABILITY: FOOD INSECURITY: WITHIN THE PAST 12 MONTHS, THE FOOD YOU BOUGHT JUST DIDN'T LAST AND YOU DIDN'T HAVE MONEY TO GET MORE.: NEVER TRUE

## 2025-03-12 SDOH — ECONOMIC STABILITY: FOOD INSECURITY: WITHIN THE PAST 12 MONTHS, YOU WORRIED THAT YOUR FOOD WOULD RUN OUT BEFORE YOU GOT MONEY TO BUY MORE.: NEVER TRUE

## 2025-03-12 ASSESSMENT — PATIENT HEALTH QUESTIONNAIRE - PHQ9
10. IF YOU CHECKED OFF ANY PROBLEMS, HOW DIFFICULT HAVE THESE PROBLEMS MADE IT FOR YOU TO DO YOUR WORK, TAKE CARE OF THINGS AT HOME, OR GET ALONG WITH OTHER PEOPLE: NOT DIFFICULT AT ALL
SUM OF ALL RESPONSES TO PHQ QUESTIONS 1-9: 0
5. POOR APPETITE OR OVEREATING: NOT AT ALL
7. TROUBLE CONCENTRATING ON THINGS, SUCH AS READING THE NEWSPAPER OR WATCHING TELEVISION: NOT AT ALL
9. THOUGHTS THAT YOU WOULD BE BETTER OFF DEAD, OR OF HURTING YOURSELF: NOT AT ALL
SUM OF ALL RESPONSES TO PHQ QUESTIONS 1-9: 0
1. LITTLE INTEREST OR PLEASURE IN DOING THINGS: NOT AT ALL
SUM OF ALL RESPONSES TO PHQ QUESTIONS 1-9: 0
4. FEELING TIRED OR HAVING LITTLE ENERGY: NOT AT ALL
SUM OF ALL RESPONSES TO PHQ QUESTIONS 1-9: 0
2. FEELING DOWN, DEPRESSED OR HOPELESS: NOT AT ALL
6. FEELING BAD ABOUT YOURSELF - OR THAT YOU ARE A FAILURE OR HAVE LET YOURSELF OR YOUR FAMILY DOWN: NOT AT ALL
3. TROUBLE FALLING OR STAYING ASLEEP: NOT AT ALL
8. MOVING OR SPEAKING SO SLOWLY THAT OTHER PEOPLE COULD HAVE NOTICED. OR THE OPPOSITE, BEING SO FIGETY OR RESTLESS THAT YOU HAVE BEEN MOVING AROUND A LOT MORE THAN USUAL: NOT AT ALL

## 2025-03-12 NOTE — PROGRESS NOTES
HPI  Ms. Nichole Daniel is a 44 y.o. year old female, she is seen today for follow up HTN, anxiety.   Plan last visit:  Blood pressure is normally better controlled at home, continue olmesartan and hydrochlorothiazide.  Anxiety not controlled, increase duloxetine to 60 mg daily and continue counseling.    Today:  No chest pain, sob, dizziness, weakness, lightheadedness.   No panic attacks recently -   Rare use clonazepam        Chief Complaint   Patient presents with    Follow-up     Room 2a- HTN, ANXIETY        Prior to Admission medications    Medication Sig Start Date End Date Taking? Authorizing Provider   olmesartan (BENICAR) 40 MG tablet TAKE 1 TABLET BY MOUTH EVERY DAY 3/3/25  Yes Jackie Hudson MD   DULoxetine (CYMBALTA) 60 MG extended release capsule TAKE 1 CAPSULE BY MOUTH EVERY DAY 1/27/25  Yes Jackie Hudson MD   hydroCHLOROthiazide (HYDRODIURIL) 25 MG tablet TAKE 1 TABLET BY MOUTH EVERY DAY 1/2/25  Yes Jackie Hudson MD   Multiple Vitamin (MULTIVITAMIN ADULT PO)    Yes Provider, MD Uzma   potassium chloride (KLOR-CON M) 20 MEQ extended release tablet Take 1 tablet by mouth daily   Yes Provider, MD Uzma   clonazePAM (KLONOPIN) 0.5 MG tablet Take 1 tablet by mouth 2 times daily as needed for Anxiety for up to 10 days. Max Daily Amount: 1 mg 7/27/23  Yes Jackie Hudson MD         No Known Allergies      REVIEW OF SYSTEMS:  Per HPI    PHYSICAL EXAM:  /80 (BP Site: Left Upper Arm, Patient Position: Sitting)   Temp 98.4 °F (36.9 °C) (Oral)   Resp 16   Ht 1.575 m (5' 2\")   Wt 89 kg (196 lb 3.2 oz)   SpO2 97%   BMI 35.89 kg/m²   Constitutional: Appears well-developed and well-nourished. No distress.   HENT:   Head: Normocephalic and atraumatic.   Eyes: No scleral icterus.   Neck: no lad, no tm, supple   Cardiovascular: Normal S1/S2, regular rhythm.  No murmurs, rubs, or gallops.  Pulmonary/Chest: Effort normal and breath sounds normal. No respiratory distress. No

## 2025-05-19 DIAGNOSIS — I10 PRIMARY HYPERTENSION: ICD-10-CM

## 2025-05-19 RX ORDER — HYDROCHLOROTHIAZIDE 25 MG/1
25 TABLET ORAL DAILY
Qty: 90 TABLET | Refills: 1 | Status: SHIPPED | OUTPATIENT
Start: 2025-05-19

## 2025-05-19 NOTE — TELEPHONE ENCOUNTER
PCP: Jackie Hudson MD     Last appt:  3/12/2025      Future Appointments   Date Time Provider Department Center   7/17/2025 10:30 AM Jackie Hudson MD [facility] DEP          Requested Prescriptions     Pending Prescriptions Disp Refills    hydroCHLOROthiazide (HYDRODIURIL) 25 MG tablet [Pharmacy Med Name: HYDROCHLOROTHIAZIDE 25 MG TAB] 90 tablet 1     Sig: TAKE 1 TABLET BY MOUTH EVERY DAY

## 2025-07-08 DIAGNOSIS — F41.9 ANXIETY: ICD-10-CM

## 2025-07-08 NOTE — TELEPHONE ENCOUNTER
Caller requests Refill of:  DULoxetine (CYMBALTA) 60 MG extended release capsule     Please send to:    [facility]/pharmacy #[id number] - [place], [place] - [address] - P [phone] - F [phone]  [address] [address]  Phone: [phone] Fax: [phone]         Visit / Appointment History:  Future Appointment at [facility]:  7/17/2025   Last Appointment With PCP:  3/12/2025       Caller confirmed instructions and dosages as correct.    Caller was advised that Meds will be refilled as soon as possible, however there can be a 48-72 business hour turn around on refill requests.

## 2025-07-08 NOTE — TELEPHONE ENCOUNTER
PCP: Jackie Hudson MD     Last appt: 3/12/2025    Future Appointments   Date Time Provider Department Center   7/17/2025 10:30 AM Jackie Hudson MD [facility] DEP          Requested Prescriptions     Pending Prescriptions Disp Refills    DULoxetine (CYMBALTA) 60 MG extended release capsule 90 capsule 0     Sig: Take 1 capsule by mouth daily

## 2025-07-10 RX ORDER — DULOXETIN HYDROCHLORIDE 60 MG/1
60 CAPSULE, DELAYED RELEASE ORAL DAILY
Qty: 90 CAPSULE | Refills: 0 | Status: SHIPPED | OUTPATIENT
Start: 2025-07-10

## 2025-07-17 ENCOUNTER — OFFICE VISIT (OUTPATIENT)
Facility: CLINIC | Age: 45
End: 2025-07-17
Payer: COMMERCIAL

## 2025-07-17 VITALS
WEIGHT: 188.5 LBS | HEIGHT: 62 IN | DIASTOLIC BLOOD PRESSURE: 78 MMHG | TEMPERATURE: 98.1 F | BODY MASS INDEX: 34.69 KG/M2 | OXYGEN SATURATION: 98 % | SYSTOLIC BLOOD PRESSURE: 124 MMHG | HEART RATE: 83 BPM

## 2025-07-17 DIAGNOSIS — F41.9 ANXIETY: ICD-10-CM

## 2025-07-17 DIAGNOSIS — I10 PRIMARY HYPERTENSION: Primary | ICD-10-CM

## 2025-07-17 DIAGNOSIS — Z11.59 NEED FOR HEPATITIS C SCREENING TEST: ICD-10-CM

## 2025-07-17 PROCEDURE — 3074F SYST BP LT 130 MM HG: CPT | Performed by: INTERNAL MEDICINE

## 2025-07-17 PROCEDURE — 3078F DIAST BP <80 MM HG: CPT | Performed by: INTERNAL MEDICINE

## 2025-07-17 PROCEDURE — 99214 OFFICE O/P EST MOD 30 MIN: CPT | Performed by: INTERNAL MEDICINE

## 2025-07-17 RX ORDER — CLONAZEPAM 0.5 MG/1
0.5 TABLET ORAL 2 TIMES DAILY PRN
Qty: 20 TABLET | Refills: 0 | Status: SHIPPED | OUTPATIENT
Start: 2025-07-17 | End: 2025-07-27

## 2025-07-17 RX ORDER — HYDROCHLOROTHIAZIDE 25 MG/1
25 TABLET ORAL DAILY
Qty: 90 TABLET | Refills: 1 | Status: SHIPPED | OUTPATIENT
Start: 2025-07-17

## 2025-07-17 RX ORDER — DULOXETIN HYDROCHLORIDE 60 MG/1
60 CAPSULE, DELAYED RELEASE ORAL DAILY
Qty: 90 CAPSULE | Refills: 0 | Status: SHIPPED | OUTPATIENT
Start: 2025-07-17

## 2025-07-17 RX ORDER — BUSPIRONE HYDROCHLORIDE 5 MG/1
5 TABLET ORAL 2 TIMES DAILY
Qty: 60 TABLET | Refills: 2 | Status: SHIPPED | OUTPATIENT
Start: 2025-07-17 | End: 2025-08-16

## 2025-07-17 RX ORDER — OLMESARTAN MEDOXOMIL 40 MG/1
40 TABLET ORAL DAILY
Qty: 90 TABLET | Refills: 1 | Status: SHIPPED | OUTPATIENT
Start: 2025-07-17

## 2025-07-17 ASSESSMENT — PATIENT HEALTH QUESTIONNAIRE - PHQ9
1. LITTLE INTEREST OR PLEASURE IN DOING THINGS: NOT AT ALL
2. FEELING DOWN, DEPRESSED OR HOPELESS: NOT AT ALL
SUM OF ALL RESPONSES TO PHQ QUESTIONS 1-9: 0

## 2025-07-17 NOTE — PROGRESS NOTES
Have you been to the ER, urgent care clinic since your last visit?  Hospitalized since your last visit?   NO    Have you seen or consulted any other health care providers outside our system since your last visit?   NO    Have you had a mammogram?”   YES - Where: 07/2025 [facility] Nurse/CMA to request most recent records if not in the chart    Date of last Mammogram: 7/9/2024       “Have you had a colorectal cancer screening such as a colonoscopy/FIT/Cologuard?    NO    No colonoscopy on file  No cologuard on file  No FIT/FOBT on file   No flexible sigmoidoscopy on file

## 2025-07-17 NOTE — PROGRESS NOTES
HPI  Ms. Nichole Daniel is a 45 y.o. year old female, she is seen today for follow up HTN, anxiety.   Plan last visit:  Blood pressure is at goal, continue current medications.  Anxiety is controlled, continue duloxetine and follow-up with therapist as planned.    Today:  Feels well overall. No chest pain, sob, dizziness, weakness, lightheadedness.  Still seeing her therapist regularly.   Anxiety is worse lately with more stressors, did have a panic attack recently but was able to think through it and not need medications.   No SI.   Has been going to [employer] with her father and youngest daughter.         Chief Complaint   Patient presents with    Hypertension    Anxiety        Prior to Admission medications    Medication Sig Start Date End Date Taking? Authorizing Provider   olmesartan (BENICAR) 40 MG tablet Take 1 tablet by mouth daily 7/17/25  Yes Jackie Hudson MD   hydroCHLOROthiazide (HYDRODIURIL) 25 MG tablet Take 1 tablet by mouth daily 7/17/25  Yes Jackie Hudson MD   clonazePAM (KLONOPIN) 0.5 MG tablet Take 1 tablet by mouth 2 times daily as needed for Anxiety for up to 10 days. Max Daily Amount: 1 mg 7/17/25 7/27/25 Yes Jackie Hudson MD   DULoxetine (CYMBALTA) 60 MG extended release capsule Take 1 capsule by mouth daily 7/17/25  Yes Jackie Hudson MD   busPIRone (BUSPAR) 5 MG tablet Take 1 tablet by mouth 2 times daily 7/17/25 8/16/25 Yes Jackie Hudson MD   Multiple Vitamin (MULTIVITAMIN ADULT PO)    Yes Provider, MD Uzma   potassium chloride (KLOR-CON M) 20 MEQ extended release tablet Take 1 tablet by mouth daily   Yes Provider, Uzma, MD         No Known Allergies      REVIEW OF SYSTEMS:  Per HPI    PHYSICAL EXAM:  /78 (BP Site: Left Upper Arm, Patient Position: Sitting)   Pulse 83   Temp 98.1 °F (36.7 °C) (Oral)   Ht 1.575 m (5' 2\")   Wt 85.5 kg (188 lb 8 oz)   SpO2 98%   BMI 34.48 kg/m²   Constitutional: Appears well-developed and

## 2025-07-18 DIAGNOSIS — I10 PRIMARY HYPERTENSION: ICD-10-CM

## 2025-07-18 DIAGNOSIS — Z11.59 NEED FOR HEPATITIS C SCREENING TEST: ICD-10-CM

## 2025-07-18 LAB
ALBUMIN SERPL-MCNC: 3.9 G/DL (ref 3.5–5)
ALBUMIN/GLOB SERPL: 1.2 (ref 1.1–2.2)
ALP SERPL-CCNC: 57 U/L (ref 45–117)
ALT SERPL-CCNC: 24 U/L (ref 12–78)
ANION GAP SERPL CALC-SCNC: 4 MMOL/L (ref 2–12)
AST SERPL-CCNC: 11 U/L (ref 15–37)
BASOPHILS # BLD: 0.06 K/UL (ref 0–0.1)
BASOPHILS NFR BLD: 1.1 % (ref 0–1)
BILIRUB SERPL-MCNC: 0.5 MG/DL (ref 0.2–1)
BUN SERPL-MCNC: 16 MG/DL (ref 6–20)
BUN/CREAT SERPL: 19 (ref 12–20)
CALCIUM SERPL-MCNC: 9.6 MG/DL (ref 8.5–10.1)
CHLORIDE SERPL-SCNC: 105 MMOL/L (ref 97–108)
CHOLEST SERPL-MCNC: 184 MG/DL
CO2 SERPL-SCNC: 30 MMOL/L (ref 21–32)
CREAT SERPL-MCNC: 0.84 MG/DL (ref 0.55–1.02)
DIFFERENTIAL METHOD BLD: ABNORMAL
EOSINOPHIL # BLD: 0.13 K/UL (ref 0–0.4)
EOSINOPHIL NFR BLD: 2.5 % (ref 0–7)
ERYTHROCYTE [DISTWIDTH] IN BLOOD BY AUTOMATED COUNT: 12.6 % (ref 11.5–14.5)
GLOBULIN SER CALC-MCNC: 3.2 G/DL (ref 2–4)
GLUCOSE SERPL-MCNC: 96 MG/DL (ref 65–100)
HCT VFR BLD AUTO: 44 % (ref 35–47)
HCV AB SER IA-ACNC: <0.02 INDEX
HCV AB SERPL QL IA: NONREACTIVE
HDLC SERPL-MCNC: 64 MG/DL
HDLC SERPL: 2.9 (ref 0–5)
HGB BLD-MCNC: 14.3 G/DL (ref 11.5–16)
IMM GRANULOCYTES # BLD AUTO: 0.02 K/UL (ref 0–0.04)
IMM GRANULOCYTES NFR BLD AUTO: 0.4 % (ref 0–0.5)
LDLC SERPL CALC-MCNC: 105.4 MG/DL (ref 0–100)
LYMPHOCYTES # BLD: 1.55 K/UL (ref 0.8–3.5)
LYMPHOCYTES NFR BLD: 29.6 % (ref 12–49)
MCH RBC QN AUTO: 31.4 PG (ref 26–34)
MCHC RBC AUTO-ENTMCNC: 32.5 G/DL (ref 30–36.5)
MCV RBC AUTO: 96.5 FL (ref 80–99)
MONOCYTES # BLD: 0.37 K/UL (ref 0–1)
MONOCYTES NFR BLD: 7.1 % (ref 5–13)
NEUTS SEG # BLD: 3.1 K/UL (ref 1.8–8)
NEUTS SEG NFR BLD: 59.3 % (ref 32–75)
NRBC # BLD: 0 K/UL (ref 0–0.01)
NRBC BLD-RTO: 0 PER 100 WBC
PLATELET # BLD AUTO: 275 K/UL (ref 150–400)
PMV BLD AUTO: 11.6 FL (ref 8.9–12.9)
POTASSIUM SERPL-SCNC: 3.9 MMOL/L (ref 3.5–5.1)
PROT SERPL-MCNC: 7.1 G/DL (ref 6.4–8.2)
RBC # BLD AUTO: 4.56 M/UL (ref 3.8–5.2)
SODIUM SERPL-SCNC: 139 MMOL/L (ref 136–145)
TRIGL SERPL-MCNC: 73 MG/DL
VLDLC SERPL CALC-MCNC: 14.6 MG/DL
WBC # BLD AUTO: 5.2 K/UL (ref 3.6–11)

## 2025-08-08 DIAGNOSIS — F41.9 ANXIETY: ICD-10-CM

## 2025-08-08 RX ORDER — BUSPIRONE HYDROCHLORIDE 5 MG/1
5 TABLET ORAL 2 TIMES DAILY
Qty: 180 TABLET | Refills: 1 | Status: SHIPPED | OUTPATIENT
Start: 2025-08-08